# Patient Record
Sex: FEMALE | Race: WHITE | Employment: FULL TIME | URBAN - METROPOLITAN AREA
[De-identification: names, ages, dates, MRNs, and addresses within clinical notes are randomized per-mention and may not be internally consistent; named-entity substitution may affect disease eponyms.]

---

## 2017-02-13 ENCOUNTER — MYC MEDICAL ADVICE (OUTPATIENT)
Dept: SURGERY | Facility: CLINIC | Age: 52
End: 2017-02-13

## 2017-02-13 DIAGNOSIS — K91.2 POSTSURGICAL MALABSORPTION: ICD-10-CM

## 2017-02-13 DIAGNOSIS — D64.9 ANEMIA: Primary | ICD-10-CM

## 2017-02-13 DIAGNOSIS — Z98.84 BARIATRIC SURGERY STATUS: ICD-10-CM

## 2017-02-13 NOTE — TELEPHONE ENCOUNTER
Patient requesting labs be placed for upcoming appointment 2/17/17..  She was seen for her 3 month PO gastric bypass appointment 11/18/16.  Orders placed per standard protocol.  Keeley Xiong MS, RD, RN

## 2017-02-14 DIAGNOSIS — D64.9 ANEMIA: ICD-10-CM

## 2017-02-14 DIAGNOSIS — Z98.84 BARIATRIC SURGERY STATUS: ICD-10-CM

## 2017-02-14 DIAGNOSIS — K91.2 POSTSURGICAL MALABSORPTION: ICD-10-CM

## 2017-02-14 LAB
ERYTHROCYTE [DISTWIDTH] IN BLOOD BY AUTOMATED COUNT: 14.4 % (ref 10–15)
HCT VFR BLD AUTO: 38.9 % (ref 35–47)
HGB BLD-MCNC: 12.6 G/DL (ref 11.7–15.7)
MCH RBC QN AUTO: 30.2 PG (ref 26.5–33)
MCHC RBC AUTO-ENTMCNC: 32.4 G/DL (ref 31.5–36.5)
MCV RBC AUTO: 93 FL (ref 78–100)
PLATELET # BLD AUTO: 219 10E9/L (ref 150–450)
PTH-INTACT SERPL-MCNC: 48 PG/ML (ref 12–72)
RBC # BLD AUTO: 4.17 10E12/L (ref 3.8–5.2)
WBC # BLD AUTO: 6 10E9/L (ref 4–11)

## 2017-02-14 PROCEDURE — 36415 COLL VENOUS BLD VENIPUNCTURE: CPT | Performed by: PHYSICIAN ASSISTANT

## 2017-02-14 PROCEDURE — 85027 COMPLETE CBC AUTOMATED: CPT | Performed by: PHYSICIAN ASSISTANT

## 2017-02-14 PROCEDURE — 82728 ASSAY OF FERRITIN: CPT | Performed by: PHYSICIAN ASSISTANT

## 2017-02-14 PROCEDURE — 83970 ASSAY OF PARATHORMONE: CPT | Performed by: PHYSICIAN ASSISTANT

## 2017-02-14 PROCEDURE — 83550 IRON BINDING TEST: CPT | Performed by: PHYSICIAN ASSISTANT

## 2017-02-14 PROCEDURE — 82306 VITAMIN D 25 HYDROXY: CPT | Performed by: PHYSICIAN ASSISTANT

## 2017-02-14 PROCEDURE — 83540 ASSAY OF IRON: CPT | Performed by: PHYSICIAN ASSISTANT

## 2017-02-15 LAB
DEPRECATED CALCIDIOL+CALCIFEROL SERPL-MC: 48 UG/L (ref 20–75)
FERRITIN SERPL-MCNC: 10 NG/ML (ref 8–252)
IRON SATN MFR SERPL: 32 % (ref 15–46)
IRON SERPL-MCNC: 109 UG/DL (ref 35–180)
TIBC SERPL-MCNC: 340 UG/DL (ref 240–430)

## 2017-02-17 ENCOUNTER — OFFICE VISIT (OUTPATIENT)
Dept: SURGERY | Facility: CLINIC | Age: 52
End: 2017-02-17
Payer: COMMERCIAL

## 2017-02-17 VITALS
HEIGHT: 65 IN | HEART RATE: 62 BPM | DIASTOLIC BLOOD PRESSURE: 71 MMHG | RESPIRATION RATE: 14 BRPM | BODY MASS INDEX: 30.72 KG/M2 | WEIGHT: 184.4 LBS | SYSTOLIC BLOOD PRESSURE: 117 MMHG

## 2017-02-17 DIAGNOSIS — Z98.84 STATUS POST BARIATRIC SURGERY: Primary | ICD-10-CM

## 2017-02-17 DIAGNOSIS — Z98.84 BARIATRIC SURGERY STATUS: ICD-10-CM

## 2017-02-17 DIAGNOSIS — E61.1 IRON DEFICIENCY: ICD-10-CM

## 2017-02-17 DIAGNOSIS — K91.2 MALNUTRITION FOLLOWING GASTROINTESTINAL SURGERY: ICD-10-CM

## 2017-02-17 DIAGNOSIS — K91.2 POSTSURGICAL MALABSORPTION: ICD-10-CM

## 2017-02-17 PROCEDURE — 97803 MED NUTRITION INDIV SUBSEQ: CPT | Performed by: DIETITIAN, REGISTERED

## 2017-02-17 PROCEDURE — 99214 OFFICE O/P EST MOD 30 MIN: CPT | Performed by: PHYSICIAN ASSISTANT

## 2017-02-17 RX ORDER — ASPIRIN 81 MG
100 TABLET, DELAYED RELEASE (ENTERIC COATED) ORAL DAILY
Qty: 60 TABLET | Refills: 1 | COMMUNITY
Start: 2017-02-17

## 2017-02-17 NOTE — PATIENT INSTRUCTIONS
Have follow up labs drawn in 6 months before your annual visit.  Continue current vitamins.  Return to clinic in 6 months.

## 2017-02-17 NOTE — PROGRESS NOTES
POST-OPERATIVE NUTRITION APPOINTMENT  DATE OF VISIT: 2017  Name: MORAIMA CARRERO  : 1965  Gender: Female  MRN: [id number]  Age: 51    ASSESSMENT:  REASON FOR VISIT:  MORAIMA CARRERO is a 51 year old Female presents today for 6 month PO nutrition follow-up appointment. She was accompanied by her spouse, Mohamud.  DIAGNOSIS:  Status post Laparoscopic Isabel-en-Y Gastric Bypass surgery.  ANTHROPOMETRICS:  Height: 66 inches  Weight: 184.4 lbs  BMI: 29.8 kg/m2  VITAMINS AND MINERALS:  PACARLOS reviewed  2 multivitamins/ minerals ([employer])  500 mg calcium with vitamin D -TID  2000 International Units Vitamin D  1000 mcg vitamin B-12, SL  2 iron  NUTRITION HISTORY:  Breakfast: Protein drink (30 g protein) within 1 hour if at home or when traveling-scrambled egg, turkey sausage or ham, small amount of fruit   Lunch: ~3 oz chicken or pork tenderloin, sugar free canned fruit or leftover green beans   Supper: ~ 3-4 oz fish or chicken or steak, vegetable, small amount of mashed or baked potato  Snacks: pretzels or few tortilla chips-afternoon and after dinner   Beverages:Crystal light Protein drink Gatorade Zero  Consuming liquid calories: none  Protein intake: 60-90 grams/day  Tolerate regular texture food: Yes  Any foods not tolerated details: fresh citrus  Portion size: 1 cup  Take 30 minutes to consume each meal: Sometimes  Eat protein foods first: Yes  Fluids and meals separate by at least 30 minutes: Yes  Chew foods 20 plus times: Yes  Tolerating diet: bariatric regular diet  Drinking high protein supplements/shakes: Yes  Consuming meals per day: 3  Consuming snacks per day: 1  Comment: Biggest concern is hunger between meals; currently snacking on high carbohydrate foods (pretzels or chips) BID; patient is pleased with her weight loss; pt travel's for work ~ 3 months out of the year and finds these days more challenging to make good food choices    PHYSICAL ACTIVITY:  Type: Cardio and weights  Frequency: 4-5 times a  "week  Duration (min): 60  DIAGNOSIS:  Previous Nutrition Diagnosis: Altered gastrointestinal function related to alteration in gastrointestinal structure as evidenced by history of Laparoscopic Isabel-en-Y Gastric Bypass surgery.  Previous goals:  Eat 3 different food groups per meal-improving  Continue following post- surgical diet guidelines-met most days  Current Nutrition Diagnosis: Altered gastrointestinal function related to alteration in gastrointestinal structure as evidenced by history of Laparoscopic Isabel-en-Y Gastric Bypass   Unchanged  INTERVENTION:  Nutrition Prescription: Eat 3 meals a day at regular intervals. Consume 60-90 grams of protein daily. Follow post-surgical vitamins and minerals protocol.  Goals:  Focus on getting in 10-15 g fiber per day (pt wants to go back to tracking on ambika) to help with satiety  Consistently take 20-30 minutes per meal  Try having \"solid food\" at breakfast and use one protein drink between meals instead of snacking   Implementation: Discussed progress toward previous goals; reinforced importance of following bariatric lifestyle changes  NUTRITION MONITORING AND EVALUATION:  Anticipated compliance: Good  Verbalized understanding by listing bariatric behaviors that are most important to her    Follow up:  Patient to follow up in 1 year.  TIME SPENT WITH PATIENT:  30 minutes  Cordell Green RD, LD  [facility]  [phone]  "

## 2017-02-17 NOTE — MR AVS SNAPSHOT
After Visit Summary   2/17/2017    Nicole Tinajero    MRN: [id number]           Patient Information     Date Of Birth          1965        Visit Information        Provider Department      2/17/2017 10:00 AM Barbra Mclain PA-C [facility]      Today's Diagnoses     Status post bariatric surgery    -  1    Iron deficiency        Malnutrition following gastrointestinal surgery        BMI 29.0-29.9,adult        Bariatric surgery status        Postsurgical malabsorption          Care Instructions    Have follow up labs drawn in 6 months before your annual visit.  Continue current vitamins.  Return to clinic in 6 months.             Follow-ups after your visit        Follow-up notes from your care team     Return in 6 months (on 8/17/2017).      Your next 10 appointments already scheduled     Aug 18, 2017 10:00 AM CDT   Annual Visit with Barbra Mclain PA-C   [facility] [place] ([facility])    [address] [place] [id number]   [phone]            Aug 18, 2017 10:30 AM CDT   Annual Visit with Logan Ross 2, RD   [facility] [place] ([facility])    [address] [place], [place] [id number]  [facility] [address]   [phone]              Future tests that were ordered for you today     Open Future Orders        Priority Expected Expires Ordered    CBC with platelets Routine 8/17/2017 2/17/2018 2/17/2017    Vitamin B12 Routine 8/17/2017 2/17/2018 2/17/2017    Vitamin D Screen Routine 8/17/2017 2/17/2018 2/17/2017    Parathyroid Hormone Intact Routine 8/17/2017 2/17/2018 2/17/2017    Iron and Iron Binding Capacity Routine 8/17/2017 2/17/2018 2/17/2017    Ferritin Routine 8/17/2017 2/17/2018 2/17/2017            Who to contact     If you have questions or need follow up information  "about today's clinic visit or your schedule please contact [facility] directly at [phone].  Normal or non-critical lab and imaging results will be communicated to you by MyChart, letter or phone within 4 business days after the clinic has received the results. If you do not hear from us within 7 days, please contact the clinic through [facility] or phone. If you have a critical or abnormal lab result, we will notify you by phone as soon as possible.  Submit refill requests through [employer]acast or call your pharmacy and they will forward the refill request to us. Please allow 3 business days for your refill to be completed.          Additional Information About Your Visit        Abacast Information     [employer] gives you secure access to your electronic health record. If you see a primary care provider, you can also send messages to your care team and make appointments. If you have questions, please call your primary care clinic.  If you do not have a primary care provider, please call [phone] and they will assist you.        Care EveryWhere ID     This is your Care EveryWhere ID. This could be used by other organizations to access your [facility] medical records  [id number]        Your Vitals Were     Pulse Respirations Height BMI (Body Mass Index)          62 14 5' 5.25\" (1.657 m) 30.45 kg/m2         Blood Pressure from Last 3 Encounters:   02/17/17 117/71   11/18/16 122/81   09/13/16 112/76    Weight from Last 3 Encounters:   02/17/17 184 lb 6.4 oz (83.6 kg)   11/18/16 205 lb 14.4 oz (93.4 kg)   08/31/16 233 lb (105.7 kg)              We Performed the Following     OP ROOMING NOTE TO MAGI        Primary Care Provider Office Phone # Fax #    June Narvaez [phone]       [facility] [address] [facility] [address]        Thank you!     Thank you for choosing [facility] [place] [facility]  for your care. Our goal is " always to provide you with excellent care. Hearing back from our patients is one way we can continue to improve our services. Please take a few minutes to complete the written survey that you may receive in the mail after your visit with us. Thank you!             Your Updated Medication List - Protect others around you: Learn how to safely use, store and throw away your medicines at www.disposemymeds.org.          This list is accurate as of: 2/17/17 11:24 AM.  Always use your most recent med list.                   Brand Name Dispense Instructions for use    BIOTIN PO      Take 10,000 mcg by mouth daily       CALCIUM CITRATE + D PO      Take 500 mg by mouth 2 times daily       CYANOCOBALAMIN SL      Place 1,000 mcg under the tongue every morning       docusate sodium 100 MG tablet    COLACE    60 tablet    Take 100 mg by mouth daily       fluticasone 50 MCG/ACT spray    FLONASE    16 g    Spray 1-2 sprays into both nostrils daily       multivitamin  peds with iron 60 MG chewable tablet      Take 2 chew tab by mouth every morning       VITAMIN D (CHOLECALCIFEROL) PO      Take 2,000 Units by mouth daily       VITRON-C PO      Take 2 tablets by mouth every morning

## 2017-02-17 NOTE — MR AVS SNAPSHOT
MRN:[id number]                      After Visit Summary   2/17/2017    Nicole Tinajero    MRN: [id number]           Visit Information        Provider Department      2/17/2017 10:30 AM 1, Logan Ross, JEFFERY [facility]      Your next 10 appointments already scheduled     Aug 18, 2017 10:00 AM CDT   Annual Visit with Barbra Mclain PA-C   [facility] ([facility])    [address] [place] [id number] [phone]            Aug 18, 2017 10:30 AM CDT   Annual Visit with Logan Ross 2, RD   [facility] [place] ([facility])    [address] [place] [id number] [phone]              MyChart Information     [facility] gives you secure access to your electronic health record. If you see a primary care provider, you can also send messages to your care team and make appointments. If you have questions, please call your primary care clinic.  If you do not have a primary care provider, please call [phone] and they will assist you.        Care EveryWhere ID     This is your Care EveryWhere ID. This could be used by other organizations to access your [facility] medical records  [id number]

## 2017-02-17 NOTE — PROGRESS NOTES
Bariatric Follow Up Visit     Date: 2017    RE: MORAIMA CARRERO  MR#: [id number]  : 1965    HISTORY OF PRESENT ILLNESS: MORAIMA CARRERO returns today for her follow-up appointment status post Laparoscopic Isabel-en-Y Gastric Bypass surgery. She has lost 72.6 lbs since starting our program. Patient is feeling well. She is doing the following exercise(s): Cardio, weights, and fitness classes. She exercises 5 times a week for 60 minutes per session.    Patient is taking the following bariatric postoperative vitamins:  2 Complete multivitamins with minerals (at different times than calcium)  2000 Int Units of Vitamin D daily   1712-6230 mg of Calcium daily in divided doses  1000 mcg of Vitamin B12 sl daily  2 Iron/Vit C. daily    OBESITY RELATED CONDITIONS:  HTN: resolved, off medications  GERD: resolved  Joint Pain: improved    SOCIAL HISTORY:  She does not smoke. She does not drink alcohol. She attends support group and feels safe in current environment.    REVIEW OF SYSTEMS:      GI:  Nausea-never  Vomiting-never  Diarrhea-never  Constipation-never  Dysphagia-never  Abdominal Pains-never  Heartburn-never    Skin:  Intertriginous Irritation-never  Hair- hair loss has slowed.     Psychiatric:  Depression never    PHYSICAL EXAMINATION:   /71  Pulse 62  Resp 14  Wt 184 lb 6.4 oz (83.6 kg)  BMI 29.76 kg/m2  GENERAL: Alert and oriented x3. NAD  HEART: Regular rate and rhythm, No murmurs, rubs or gallops  LUNGS: Breathing unlabored, Lung sounds clear to auscultation bilaterally  ABDOMEN: soft; nontender; nondistended, BS present,Incisions well healed.     EXTREMITIES: No LE Edema  SKIN: no intertriginous irritation.    ASSESSMENT/PLAN:   6 months s/p Laparoscopic Isabel-en-Y Gastric Bypass  Morbid obesity   resolved BMI 29  Post surgical malabsorption   Reviewed CBC, vitamin B12, vitamin D, PTH, ferritin, TIBC, and iron labs.   Ordered CBC, vitamin B12, vitamin D, PTH, ferritin, TIBC, and iron labs to be drawn  in 6 months before annual  appt.    Follow food plan per dietitian recommendations.   Continue taking recommended post-op vitamins.  HTN- resolved  GERD resolved  Iron deficiency    continue 2 iron supplements daily along with 1 colace.  Telogen Effluvium- resolved    Follow up:   Return to clinic in 3 months.

## 2017-04-20 ENCOUNTER — OFFICE VISIT (OUTPATIENT)
Dept: INTERNAL MEDICINE | Facility: CLINIC | Age: 52
End: 2017-04-20
Payer: COMMERCIAL

## 2017-04-20 VITALS
HEIGHT: 65 IN | OXYGEN SATURATION: 99 % | HEART RATE: 85 BPM | DIASTOLIC BLOOD PRESSURE: 70 MMHG | WEIGHT: 181 LBS | SYSTOLIC BLOOD PRESSURE: 110 MMHG | TEMPERATURE: 98.4 F | BODY MASS INDEX: 30.16 KG/M2

## 2017-04-20 DIAGNOSIS — R32 URINARY INCONTINENCE, UNSPECIFIED TYPE: Primary | ICD-10-CM

## 2017-04-20 LAB
ALBUMIN UR-MCNC: NEGATIVE MG/DL
APPEARANCE UR: CLEAR
BILIRUB UR QL STRIP: NEGATIVE
COLOR UR AUTO: YELLOW
GLUCOSE UR STRIP-MCNC: NEGATIVE MG/DL
HGB UR QL STRIP: NEGATIVE
KETONES UR STRIP-MCNC: ABNORMAL MG/DL
LEUKOCYTE ESTERASE UR QL STRIP: NEGATIVE
MUCOUS THREADS #/AREA URNS LPF: PRESENT /LPF
NITRATE UR QL: NEGATIVE
NON-SQ EPI CELLS #/AREA URNS LPF: ABNORMAL /LPF
PH UR STRIP: 5.5 PH (ref 5–7)
RBC #/AREA URNS AUTO: ABNORMAL /HPF (ref 0–2)
SP GR UR STRIP: 1.02 (ref 1–1.03)
URN SPEC COLLECT METH UR: ABNORMAL
UROBILINOGEN UR STRIP-ACNC: 0.2 EU/DL (ref 0.2–1)
WBC #/AREA URNS AUTO: ABNORMAL /HPF (ref 0–2)

## 2017-04-20 PROCEDURE — 99213 OFFICE O/P EST LOW 20 MIN: CPT | Performed by: NURSE PRACTITIONER

## 2017-04-20 PROCEDURE — 81001 URINALYSIS AUTO W/SCOPE: CPT | Performed by: NURSE PRACTITIONER

## 2017-04-20 NOTE — LETTER
[facility]  [address], [address]  [place]  [address]      April 20, 2017    Nicole Tianjero                                                                                                                                                       [address]  [facility] [address]      Dear Nicole,    The results of your recent tests were within normal limits/stable.    Enclosed is a copy of the results.  It was a pleasure to see you at your last appointment.    If you have any questions or concerns, please contact our office at [phone].        Sincerely,      Beatris Villanueva C.N.P.

## 2017-04-20 NOTE — NURSING NOTE
"Chief Complaint   Patient presents with     Incontinence     Vaginal Itching       Initial /70  Pulse 85  Temp 98.4  F (36.9  C) (Oral)  Ht 5' 5.25\" (1.657 m)  Wt 181 lb (82.1 kg)  SpO2 99%  BMI 29.89 kg/m2 Estimated body mass index is 29.89 kg/(m^2) as calculated from the following:    Height as of this encounter: 5' 5.25\" (1.657 m).    Weight as of this encounter: 181 lb (82.1 kg).  Medication Reconciliation: complete    "

## 2017-04-20 NOTE — PROGRESS NOTES
SUBJECTIVE:                                                    Nicole Tinajero is a 51 year old female who presents to clinic today for the following health issues:        URINARY TRACT SYMPTOMS      Duration: months    Description  incontinence    Intensity:  Intermittent dribbling small amount urine    Accompanying signs and symptoms:  Fever/chills: no   Flank pain no   Nausea and vomiting: no   Vaginal symptoms: irritation of labia  Abdominal/Pelvic Pain: no     History  History of frequent UTI's: no   History of kidney stones: no   Sexually Active: YES  Possibility of pregnancy: No    Precipitating or alleviating factors: worse with exercise, can wake with damp underpants    Therapies tried and outcome: none           Patient Active Problem List   Diagnosis     CARDIOVASCULAR SCREENING; LDL GOAL LESS THAN 130     Female stress incontinence     Bariatric surgery status     BMI 29.0-29.9,adult     Malnutrition following gastrointestinal surgery     Past Surgical History:   Procedure Laterality Date     LAPAROSCOPIC BYPASS GASTRIC, CHOLECYSTECTOMY, COMBINED N/A 8/17/2016    Procedure: COMBINED LAPAROSCOPIC BYPASS GASTRIC, CHOLECYSTECTOMY;  Surgeon: Nemesio Clarke MD;  Location:  OR       Social History   Substance Use Topics     Smoking status: Never Smoker     Smokeless tobacco: Never Used     Alcohol use Yes      Comment: once per month     Family History   Problem Relation Age of Onset     CANCER Father      Chronic Obstructive Pulmonary Disease Mother          Current Outpatient Prescriptions   Medication Sig Dispense Refill     docusate sodium (COLACE) 100 MG tablet Take 100 mg by mouth daily 60 tablet 1     BIOTIN PO Take 10,000 mcg by mouth daily       Iron-Vitamin C (VITRON-C PO) Take 2 tablets by mouth every morning        Calcium Citrate-Vitamin D (CALCIUM CITRATE + D PO) Take 500 mg by mouth 2 times daily        fluticasone (FLONASE) 50 MCG/ACT nasal spray Spray 1-2 sprays into both  "nostrils daily 16 g 0     CYANOCOBALAMIN SL Place 1,000 mcg under the tongue every morning        VITAMIN D, CHOLECALCIFEROL, PO Take 2,000 Units by mouth daily       multivitamin  peds with iron (FLINTSTONES COMPLETE) 60 MG chewable tablet Take 2 chew tab by mouth every morning        BP Readings from Last 3 Encounters:   04/20/17 110/70   02/17/17 117/71   11/18/16 122/81    Wt Readings from Last 3 Encounters:   04/20/17 181 lb (82.1 kg)   02/17/17 184 lb 6.4 oz (83.6 kg)   11/18/16 205 lb 14.4 oz (93.4 kg)                    ROS:  Constitutional, HEENT, cardiovascular, pulmonary, gi and gu systems are negative, except as otherwise noted.    OBJECTIVE:                                                    /70  Pulse 85  Temp 98.4  F (36.9  C) (Oral)  Ht 5' 5.25\" (1.657 m)  Wt 181 lb (82.1 kg)  SpO2 99%  BMI 29.89 kg/m2  Body mass index is 29.89 kg/(m^2).  GENERAL: healthy, alert and no distress         ASSESSMENT/PLAN:                                                              ICD-10-CM    1. Urinary incontinence, unspecified type R32 UA with Microscopic reflex to Culture     UROLOGY ADULT REFERRAL       Patient Instructions   Beatris Villanueva, NP  [facility]      "

## 2017-04-20 NOTE — MR AVS SNAPSHOT
After Visit Summary   4/20/2017    Nicole Tinajero    MRN: [id number]           Patient Information     Date Of Birth          1965        Visit Information        Provider Department      4/20/2017 10:40 AM Beatris Villanueva NP [facility]        Today's Diagnoses     Urinary incontinence, unspecified type    -  1      Care Instructions    Beatris Villanueva CNP          Follow-ups after your visit        Additional Services     UROLOGY ADULT REFERRAL       Your provider has referred you to: FMG: [facility] for Bladder Control [facility] [phone]   [email or web address]    Please be aware that coverage of these services is subject to the terms and limitations of your health insurance plan.  Call member services at your health plan with any benefit or coverage questions.      Please bring the following with you to your appointment:    (1) Any X-Rays, CTs or MRIs which have been performed.  Contact the facility where they were done to arrange for  prior to your scheduled appointment.    (2) List of current medications  (3) This referral request   (4) Any documents/labs given to you for this referral                  Your next 10 appointments already scheduled     Aug 18, 2017 10:00 AM CDT   Annual Visit with Barbra Mclain PA-C   [facility])    [address] [place] [id number] [phone]            Aug 18, 2017 10:30 AM CDT   Annual Visit with Logan Ross 2, RD   [facility])    [address] [place] [id number] [phone]              Who to contact     If you have questions or need follow up information about today's clinic visit or your schedule please contact [facility] directly at [phone].  Normal or  "non-critical lab and imaging results will be communicated to you by MyChart, letter or phone within 4 business days after the clinic has received the results. If you do not hear from us within 7 days, please contact the clinic through Slackt or phone. If you have a critical or abnormal lab result, we will notify you by phone as soon as possible.  Submit refill requests through xzoops or call your pharmacy and they will forward the refill request to us. Please allow 3 business days for your refill to be completed.          Additional Information About Your Visit        xzoops Information     x[employer]ops gives you secure access to your electronic health record. If you see a primary care provider, you can also send messages to your care team and make appointments. If you have questions, please call your primary care clinic.  If you do not have a primary care provider, please call [phone] and they will assist you.        Care EveryWhere ID     This is your Care EveryWhere ID. This could be used by other organizations to access your [facility] medical records  [id number]        Your Vitals Were     Pulse Temperature Height Pulse Oximetry BMI (Body Mass Index)       85 98.4  F (36.9  C) (Oral) 5' 5.25\" (1.657 m) 99% 29.89 kg/m2        Blood Pressure from Last 3 Encounters:   04/20/17 110/70   02/17/17 117/71   11/18/16 122/81    Weight from Last 3 Encounters:   04/20/17 181 lb (82.1 kg)   02/17/17 184 lb 6.4 oz (83.6 kg)   11/18/16 205 lb 14.4 oz (93.4 kg)              We Performed the Following     UA with Microscopic reflex to Culture     UROLOGY ADULT REFERRAL        Primary Care Provider Office Phone # Fax #    June Narvaez [phone]       [facility] [address] [facility] [address]        Thank you!     Thank you for choosing [facility]  for your care. Our goal is always to provide you with excellent care. Hearing back from our patients is one way we " can continue to improve our services. Please take a few minutes to complete the written survey that you may receive in the mail after your visit with us. Thank you!             Your Updated Medication List - Protect others around you: Learn how to safely use, store and throw away your medicines at www.disposemymeds.org.          This list is accurate as of: 4/20/17 11:15 AM.  Always use your most recent med list.                   Brand Name Dispense Instructions for use    BIOTIN PO      Take 10,000 mcg by mouth daily       CALCIUM CITRATE + D PO      Take 500 mg by mouth 2 times daily       CYANOCOBALAMIN SL      Place 1,000 mcg under the tongue every morning       docusate sodium 100 MG tablet    COLACE    60 tablet    Take 100 mg by mouth daily       fluticasone 50 MCG/ACT spray    FLONASE    16 g    Spray 1-2 sprays into both nostrils daily       multivitamin  peds with iron 60 MG chewable tablet      Take 2 chew tab by mouth every morning       VITAMIN D (CHOLECALCIFEROL) PO      Take 2,000 Units by mouth daily       VITRON-C PO      Take 2 tablets by mouth every morning

## 2017-06-08 ENCOUNTER — OFFICE VISIT (OUTPATIENT)
Dept: UROLOGY | Facility: CLINIC | Age: 52
End: 2017-06-08
Payer: COMMERCIAL

## 2017-06-08 DIAGNOSIS — N39.3 FEMALE STRESS INCONTINENCE: Primary | ICD-10-CM

## 2017-06-08 LAB
ALBUMIN UR-MCNC: NEGATIVE MG/DL
APPEARANCE UR: CLEAR
BILIRUB UR QL STRIP: NEGATIVE
COLOR UR AUTO: YELLOW
GLUCOSE UR STRIP-MCNC: NEGATIVE MG/DL
HGB UR QL STRIP: NEGATIVE
KETONES UR STRIP-MCNC: ABNORMAL MG/DL
LEUKOCYTE ESTERASE UR QL STRIP: ABNORMAL
NITRATE UR QL: NEGATIVE
PH UR STRIP: 5 PH (ref 5–7)
SP GR UR STRIP: 1.02 (ref 1–1.03)
URN SPEC COLLECT METH UR: ABNORMAL
UROBILINOGEN UR STRIP-ACNC: 0.2 EU/DL (ref 0.2–1)

## 2017-06-08 PROCEDURE — 99244 OFF/OP CNSLTJ NEW/EST MOD 40: CPT | Mod: 25 | Performed by: UROLOGY

## 2017-06-08 PROCEDURE — 52000 CYSTOURETHROSCOPY: CPT | Performed by: UROLOGY

## 2017-06-08 PROCEDURE — 81003 URINALYSIS AUTO W/O SCOPE: CPT | Mod: QW | Performed by: UROLOGY

## 2017-06-08 NOTE — PROGRESS NOTES
Nicole Tinajero is a 52 year old female seen in consultation for incont. Consult from June Narvaez.      Pt reports several yr hx of progressive CARLOS, now very bothersome, limits exercise, wears several light pads (more to exercise). Also some insensate loss, marlen at nite; rarely wears pad at nite.    Denies dysuria, gross hematuria, frequency, prior  eval, signif UTI's, prior bladder surgery, use of bladder meds, success with Kegel's.     Hx 2 vag deliveries,  vas. Denies constipation, fecal incont. Moderate fluid and caffeine.    Works in [employer]; job located in [facility].      Past Medical History:   Diagnosis Date     Hypertension        Past Surgical History:   Procedure Laterality Date     LAPAROSCOPIC BYPASS GASTRIC, CHOLECYSTECTOMY, COMBINED N/A 8/17/2016    Procedure: COMBINED LAPAROSCOPIC BYPASS GASTRIC, CHOLECYSTECTOMY;  Surgeon: Nemesio Clarke MD;  Location:  OR       Social History     Social History     Marital status:      Spouse name: N/A     Number of children: N/A     Years of education: N/A     Occupational History     Not on file.     Social History Main Topics     Smoking status: Never Smoker     Smokeless tobacco: Never Used     Alcohol use Yes      Comment: once per month     Drug use: No     Sexual activity: Yes     Partners: Male      Comment: ; 2 children (26 and 19)     Other Topics Concern     Not on file     Social History Narrative       Current Outpatient Prescriptions   Medication Sig Dispense Refill     docusate sodium (COLACE) 100 MG tablet Take 100 mg by mouth daily 60 tablet 1     BIOTIN PO Take 10,000 mcg by mouth daily       Iron-Vitamin C (VITRON-C PO) Take 2 tablets by mouth every morning        Calcium Citrate-Vitamin D (CALCIUM CITRATE + D PO) Take 500 mg by mouth 2 times daily        CYANOCOBALAMIN SL Place 1,000 mcg under the tongue every morning        VITAMIN D, CHOLECALCIFEROL, PO Take 2,000 Units by mouth daily        multivitamin  peds with iron (FLINTSTONES COMPLETE) 60 MG chewable tablet Take 2 chew tab by mouth every morning        fluticasone (FLONASE) 50 MCG/ACT nasal spray Spray 1-2 sprays into both nostrils daily (Patient not taking: Reported on 6/8/2017) 16 g 0       Physical Exam:    GENL: NAD.    ABD: Soft, non-tender, no masses.    EG: Well-estrogenized, no masses.    VAGINA: Well-estrogenized, no masses.    BN HYPERMOBILITY: Moderate.    CYSTOCELE: Grade 1.    APICAL PROLAPSE: Minimal.    RECTOCELE: Minimal.    BIMANUAL: No mass or tenderness.    Cysto:    (Informed consent obtained. Pause for cause performed)   Sterile prep.    17 Fr scope inserted through urethra. Systematic examination w 70 degree lens.   PVR: 10 cc   MUCOSA: Normal without lesion   ORIFICES: Normal location and morphology   CAPACITY: 400 cc; no pain with filling   Scope withdrawn without untoward effect.    Valsalva:   Mild hypermobility, moderate leakage noted.    (Pt tolerated procedure without difficulty).      Results for orders placed or performed in visit on 06/08/17   UA without Microscopic   Result Value Ref Range    Color Urine Yellow     Appearance Urine Clear     Glucose Urine Negative NEG mg/dL    Bilirubin Urine Negative NEG    Ketones Urine Trace (A) NEG mg/dL    Specific Gravity Urine 1.020 1.003 - 1.035    Blood Urine Negative NEG    pH Urine 5.0 5.0 - 7.0 pH    Protein Albumin Urine Negative NEG mg/dL    Urobilinogen Urine 0.2 0.2 - 1.0 EU/dL    Nitrite Urine Negative NEG    Leukocyte Esterase Urine Trace (A) NEG    Source Midstream Urine          IMP:  1. CARLOS with hypermobility      PLAN:  1. Discussed situation with patient in detail.    2. ALTIS SLING DISCUSSION: We discussed the patients situation in detail including her specific anatomy and conditions. Diagrams are drawn.    We discussed the surgical treatment including Altis pubovaginal sling utilizing mesh material. We addressed the technical aspects of the procedure as  well as the potential risks and complications incuding, but not limited to bleeding, infection, damage to organs or other injury. We discussed the recovery process and the potential effect on sexual function in detail. She also understands the alternative forms of therapy (including no therapy and treatment without mesh), and the potential need for additional therapy. We discussed the FDA report on the use of surgical mesh. All questions are answered in detail. Informed consent is obtained. Consent form signed. Handout given.    Pt is eager to proceed asap.    3. 60 minutes spent with patient, more than 50% in counseling and coordination of care for incont, which did not include time spent for the procedure.    4. Copy Solange

## 2017-06-08 NOTE — MR AVS SNAPSHOT
After Visit Summary   6/8/2017    Nicole Tinajero    MRN: [id number]           Patient Information     Date Of Birth          1965        Visit Information        Provider Department      6/8/2017 2:00 PM Raad Rhoades MD [facility]        Today's Diagnoses     Female stress incontinence    -  1       Follow-ups after your visit        Your next 10 appointments already scheduled     Aug 18, 2017 10:00 AM CDT   Annual Visit with Barbra Mclain PA-C   [facility] ([facility])    [address] [place] [id number] [phone]            Aug 18, 2017 10:30 AM CDT   Annual Visit with Logan Ross 2, RD   [facility] ([facility])    [address] [place] [id number] [phone]              Who to contact     If you have questions or need follow up information about today's clinic visit or your schedule please contact [facility] [place] directly at [phone].  Normal or non-critical lab and imaging results will be communicated to you by [employer], letter or phone within 4 business days after the clinic has received the results. If you do not hear from us within 7 days, please contact the clinic through [employer] or phone. If you have a critical or abnormal lab result, we will notify you by phone as soon as possible.  Submit refill requests through [employer] or call your pharmacy and they will forward the refill request to us. Please allow 3 business days for your refill to be completed.          Additional Information About Your Visit        MyChart Information     [employer] gives you secure access to your electronic health record. If you see a primary care provider, you can also send messages to your care team and make appointments. If you have questions,  please call your primary care clinic.  If you do not have a primary care provider, please call [phone] and they will assist you.        Care EveryWhere ID     This is your Care EveryWhere ID. This could be used by other organizations to access your [facility] medical records  [id number]        Your Vitals Were     Last Period                   06/03/2017            Blood Pressure from Last 3 Encounters:   04/20/17 110/70   02/17/17 117/71   11/18/16 122/81    Weight from Last 3 Encounters:   04/20/17 181 lb (82.1 kg)   02/17/17 184 lb 6.4 oz (83.6 kg)   11/18/16 205 lb 14.4 oz (93.4 kg)              We Performed the Following     CYSTOURETHROSCOPY     UA without Microscopic        Primary Care Provider Office Phone # Fax #    June Narvaez [phone]       [facility] [address] [place] [address]        Thank you!     Thank you for choosing [facility]  for your care. Our goal is always to provide you with excellent care. Hearing back from our patients is one way we can continue to improve our services. Please take a few minutes to complete the written survey that you may receive in the mail after your visit with us. Thank you!             Your Updated Medication List - Protect others around you: Learn how to safely use, store and throw away your medicines at www.disposemymeds.org.          This list is accurate as of: 6/8/17  3:07 PM.  Always use your most recent med list.                   Brand Name Dispense Instructions for use    BIOTIN PO      Take 10,000 mcg by mouth daily       CALCIUM CITRATE + D PO      Take 500 mg by mouth 2 times daily       CYANOCOBALAMIN SL      Place 1,000 mcg under the tongue every morning       docusate sodium 100 MG tablet    COLACE    60 tablet    Take 100 mg by mouth daily       fluticasone 50 MCG/ACT spray    FLONASE    16 g    Spray 1-2 sprays into both nostrils daily       multivitamin   peds with iron 60 MG chewable tablet      Take 2 chew tab by mouth every morning       VITAMIN D (CHOLECALCIFEROL) PO      Take 2,000 Units by mouth daily       VITRON-C PO      Take 2 tablets by mouth every morning

## 2017-06-13 ENCOUNTER — OFFICE VISIT (OUTPATIENT)
Dept: INTERNAL MEDICINE | Facility: CLINIC | Age: 52
End: 2017-06-13
Payer: COMMERCIAL

## 2017-06-13 VITALS
OXYGEN SATURATION: 97 % | HEART RATE: 55 BPM | HEIGHT: 65 IN | BODY MASS INDEX: 28.66 KG/M2 | TEMPERATURE: 97 F | DIASTOLIC BLOOD PRESSURE: 72 MMHG | SYSTOLIC BLOOD PRESSURE: 106 MMHG | WEIGHT: 172 LBS | RESPIRATION RATE: 12 BRPM

## 2017-06-13 DIAGNOSIS — N39.3 FEMALE STRESS INCONTINENCE: ICD-10-CM

## 2017-06-13 DIAGNOSIS — Z98.84 BARIATRIC SURGERY STATUS: ICD-10-CM

## 2017-06-13 DIAGNOSIS — Z01.818 PREOPERATIVE EXAMINATION: Primary | ICD-10-CM

## 2017-06-13 PROCEDURE — 99203 OFFICE O/P NEW LOW 30 MIN: CPT | Performed by: NURSE PRACTITIONER

## 2017-06-13 PROCEDURE — 93000 ELECTROCARDIOGRAM COMPLETE: CPT | Performed by: NURSE PRACTITIONER

## 2017-06-13 NOTE — PATIENT INSTRUCTIONS
No aspirin ibuprofen or aleve products prior to surgery   May use tylenol products       Hold oral medication day of surgery

## 2017-06-13 NOTE — NURSING NOTE
"Chief Complaint   Patient presents with     Pre-Op Exam       Initial /72 (BP Location: Left arm, Patient Position: Chair, Cuff Size: Adult Large)  Pulse 55  Temp 97  F (36.1  C) (Oral)  Resp 12  Ht 5' 5.25\" (1.657 m)  Wt 172 lb (78 kg)  LMP 06/03/2017  SpO2 97%  BMI 28.4 kg/m2 Estimated body mass index is 28.4 kg/(m^2) as calculated from the following:    Height as of this encounter: 5' 5.25\" (1.657 m).    Weight as of this encounter: 172 lb (78 kg).  Medication Reconciliation: complete     NHUNG Tran      "

## 2017-06-13 NOTE — MR AVS SNAPSHOT
After Visit Summary   6/13/2017    Nicole Tinajero    MRN: [id number]           Patient Information     Date Of Birth          1965        Visit Information        Provider Department      6/13/2017 2:40 PM Fatuma Steinberg APRN CNP [facility]        Today's Diagnoses     Preoperative examination    -  1    Female stress incontinence        Bariatric surgery status          Care Instructions    No aspirin ibuprofen or aleve products prior to surgery   May use tylenol products       Hold oral medication day of surgery           Follow-ups after your visit        Your next 10 appointments already scheduled     Jun 19, 2017   Procedure with Raad Rhoades MD   [facility] (--)    [address]  [facility] [address]   [phone]            Jun 26, 2017  3:40 PM CDT   Office Visit with June Narvaez MD   [facility] ([facility])    [address]  [facility] [address]   [phone]           Bring a current list of meds and any records pertaining to this visit.  For Physicals, please bring immunization records and any forms needing to be filled out.  Please arrive 10 minutes early to complete paperwork.            Jul 13, 2017 11:15 AM CDT   Return Visit with Raad Rhoades MD   [facility] ([address] [address]   [phone]            Aug 18, 2017 10:00 AM CDT   Annual Visit with Barbra Mclain PA-C   [facility])    [address] [place] [phone]   [phone]            Aug 18, 2017 10:30 AM CDT   Annual Visit with Logan Ross 2, RD   [facility])    [address] [place] [id number]   [phone]              Who to  "contact     If you have questions or need follow up information about today's clinic visit or your schedule please contact [facility] directly at [phone].  Normal or non-critical lab and imaging results will be communicated to you by MyChart, letter or phone within 4 business days after the clinic has received the results. If you do not hear from us within 7 days, please contact the clinic through MyChart or phone. If you have a critical or abnormal lab result, we will notify you by phone as soon as possible.  Submit refill requests through [employer] or call your pharmacy and they will forward the refill request to us. Please allow 3 business days for your refill to be completed.          Additional Information About Your Visit        [employer] Information     [employer] gives you secure access to your electronic health record. If you see a primary care provider, you can also send messages to your care team and make appointments. If you have questions, please call your primary care clinic.  If you do not have a primary care provider, please call [phone] and they will assist you.        Care EveryWhere ID     This is your Care EveryWhere ID. This could be used by other organizations to access your [place] medical records  [id number]        Your Vitals Were     Pulse Temperature Respirations Height Last Period Pulse Oximetry    55 97  F (36.1  C) (Oral) 12 5' 5.25\" (1.657 m) 06/03/2017 97%    BMI (Body Mass Index)                   28.4 kg/m2            Blood Pressure from Last 3 Encounters:   06/13/17 106/72   04/20/17 110/70   02/17/17 117/71    Weight from Last 3 Encounters:   06/13/17 172 lb (78 kg)   04/20/17 181 lb (82.1 kg)   02/17/17 184 lb 6.4 oz (83.6 kg)              We Performed the Following     EKG 12-lead complete w/read - Clinics        Primary Care Provider Office Phone # Fax #    June Narvaez [phone]       [facility] [address] [facility] [address]        Thank you!     Thank you for choosing [facility]  for your care. Our goal is always to provide you with excellent care. Hearing back from our patients is one way we can continue to improve our services. Please take a few minutes to complete the written survey that you may receive in the mail after your visit with us. Thank you!             Your Updated Medication List - Protect others around you: Learn how to safely use, store and throw away your medicines at www.disposemymeds.org.          This list is accurate as of: 6/13/17  3:08 PM.  Always use your most recent med list.                   Brand Name Dispense Instructions for use    BIOTIN PO      Take 10,000 mcg by mouth daily       CALCIUM CITRATE + D PO      Take 500 mg by mouth 2 times daily       CYANOCOBALAMIN SL      Place 1,000 mcg under the tongue every morning       docusate sodium 100 MG tablet    COLACE    60 tablet    Take 100 mg by mouth daily       fluticasone 50 MCG/ACT spray    FLONASE    16 g    Spray 1-2 sprays into both nostrils daily       multivitamin  peds with iron 60 MG chewable tablet      Take 2 chew tab by mouth every morning       VITAMIN D (CHOLECALCIFEROL) PO      Take 2,000 Units by mouth daily       VITRON-C PO      Take 2 tablets by mouth every morning

## 2017-06-13 NOTE — PROGRESS NOTES
[place] [address]  [facility] [address]  [phone]  Dept: [phone]    PRE-OP EVALUATION:  Today's date: 2017    Nicole Tinajero (: 1965) presents for pre-operative evaluation assessment as requested by Dr. Rhoades.  She requires evaluation and anesthesia risk assessment prior to undergoing surgery/procedure for treatment of  Need to help with incontinence  .  Proposed procedure: pubovaginal sling (altis)    Date of Surgery/ Procedure: 17  Time of Surgery/ Procedure: 9:20 AM  Hospital/Surgical [facility]    Primary Physician: June Narvaez  Type of Anesthesia Anticipated: General    Patient has a Health Care Directive or Living Will:  NO    Preop Questions 2017   1.  Do you have a history of heart attack, stroke, stent, bypass or surgery on an artery in the head, neck, heart or legs? No   2.  Do you ever have any pain or discomfort in your chest? No   3.  Do you have a history of  Heart Failure? No   4.   Are you troubled by shortness of breath when:  walking on a level surface, or up a slight hill, or at night? No   5.  Do you currently have a cold, bronchitis or other respiratory infection? No   6.  Do you have a cough, shortness of breath, or wheezing? No   7.  Do you sometimes get pains in the calves of your legs when you walk? No   8. Do you or anyone in your family have previous history of blood clots? No   9.  Do you or does anyone in your family have a serious bleeding problem such as prolonged bleeding following surgeries or cuts? No   10. Have you ever had problems with anemia or been told to take iron pills? YES- takes iron pills post gastric bypass    11. Have you had any abnormal blood loss such as black, tarry or bloody stools, or abnormal vaginal bleeding? No   12. Have you ever had a blood transfusion? No   13. Have you or any of your relatives ever had problems with anesthesia? No   14. Do you have sleep apnea, excessive  snoring or daytime drowsiness? No   15. Do you have any prosthetic heart valves? No   16. Do you have prosthetic joints? No   17. Is there any chance that you may be pregnant? No         HPI:                                                      Brief HPI related to upcoming procedure: needs repair of bladder due to incontinence    Gastric bypass 8/17/2016    See problem list for active medical problems.  Problems all longstanding and stable, except as noted/documented.  See ROS for pertinent symptoms related to these conditions.                                                                                                  .    MEDICAL HISTORY:                                                      Patient Active Problem List    Diagnosis Date Noted     BMI 29.0-29.9,adult 02/17/2017     Priority: Medium     Malnutrition following gastrointestinal surgery 02/17/2017     Priority: Medium     Bariatric surgery status 08/23/2016     Priority: Medium     Female stress incontinence 04/05/2016     Priority: Medium     CARDIOVASCULAR SCREENING; LDL GOAL LESS THAN 130 12/06/2013     Priority: Medium      Past Medical History:   Diagnosis Date     Hypertension      Past Surgical History:   Procedure Laterality Date     LAPAROSCOPIC BYPASS GASTRIC, CHOLECYSTECTOMY, COMBINED N/A 8/17/2016    Procedure: COMBINED LAPAROSCOPIC BYPASS GASTRIC, CHOLECYSTECTOMY;  Surgeon: Nemesio Clarke MD;  Location:  OR     Current Outpatient Prescriptions   Medication Sig Dispense Refill     docusate sodium (COLACE) 100 MG tablet Take 100 mg by mouth daily 60 tablet 1     BIOTIN PO Take 10,000 mcg by mouth daily       Iron-Vitamin C (VITRON-C PO) Take 2 tablets by mouth every morning        Calcium Citrate-Vitamin D (CALCIUM CITRATE + D PO) Take 500 mg by mouth 2 times daily        fluticasone (FLONASE) 50 MCG/ACT nasal spray Spray 1-2 sprays into both nostrils daily 16 g 0     CYANOCOBALAMIN SL Place 1,000 mcg under the tongue every  "morning        VITAMIN D, CHOLECALCIFEROL, PO Take 2,000 Units by mouth daily       multivitamin  peds with iron (FLINTSTONES COMPLETE) 60 MG chewable tablet Take 2 chew tab by mouth every morning        OTC products: None, except as noted above    Allergies   Allergen Reactions     Sulfa Drugs Hives     Rash/hives        Latex Allergy: NO    Social History   Substance Use Topics     Smoking status: Never Smoker     Smokeless tobacco: Never Used     Alcohol use Yes      Comment: once per month     History   Drug Use No       REVIEW OF SYSTEMS:                                                    C: NEGATIVE for fever, chills, change in weight  I: NEGATIVE for worrisome rashes, moles or lesions  E: NEGATIVE for vision changes or irritation  E/M: NEGATIVE for ear, mouth and throat problems  R: NEGATIVE for significant cough or SOB  CV: NEGATIVE for chest pain, palpitations or peripheral edema  GI: NEGATIVE for nausea, abdominal pain, heartburn, or change in bowel habits  : stress incontinence   M: NEGATIVE for significant arthralgias or myalgia  N: NEGATIVE for weakness, dizziness or paresthesias  E: NEGATIVE for temperature intolerance, skin/hair changes  H: NEGATIVE for bleeding problems  P: NEGATIVE for changes in mood or affect    EXAM:                                                    /72 (BP Location: Left arm, Patient Position: Chair, Cuff Size: Adult Large)  Pulse 55  Temp 97  F (36.1  C) (Oral)  Resp 12  Ht 5' 5.25\" (1.657 m)  Wt 172 lb (78 kg)  LMP 06/03/2017  SpO2 97%  BMI 28.4 kg/m2    GENERAL APPEARANCE: alert and no distress     HENT: ear canals and TM's normal and nose and mouth without ulcers or lesions     RESP: lungs clear to auscultation - no rales, rhonchi or wheezes     CV: regular rates and rhythm, normal S1 S2, no S3 or S4 and no murmur, click or rub     ABDOMEN:  soft, nontender, no HSM or masses and bowel sounds normal     MS: extremities normal- no gross deformities noted, no " evidence of inflammation in joints, FROM in all extremities.     SKIN: no suspicious lesions or rashes     NEURO: Normal strength and tone, sensory exam grossly normal, mentation intact and speech normal     PSYCH: mentation appears normal. and affect normal/bright    DIAGNOSTICS:                                                    EKG: sinus bradycardia, normal axis, normal intervals, no acute ST/T changes c/w ischemia, no LVH by voltage criteria, unchanged from previous tracings    Recent Labs   Lab Test  02/14/17   1015  08/18/16   0717  08/17/16   1530  08/17/16   0851  08/12/16   0936  04/08/16   1435   HGB  12.6  11.3*   --    --   13.0  12.9   PLT  219   --   236   --    --   287   INR   --    --    --    --    --   0.96   NA   --   139   --    --   138  138   POTASSIUM   --   4.2   --   4.0  4.3  3.8   CR   --    --   0.83   --   0.75  0.68   A1C   --    --    --    --    --   5.4        IMPRESSION:                                                    Reason for surgery/procedure: incontinence- needs help with bladder sling   Diagnosis/reason for consult: pre op     The proposed surgical procedure is considered INTERMEDIATE risk.    REVISED CARDIAC RISK INDEX  The patient has the following serious cardiovascular risks for perioperative complications such as (MI, PE, VFib and 3  AV Block):  No serious cardiac risks  INTERPRETATION: 1 risks: Class II (low risk - 0.9% complication rate)    The patient has the following additional risks for perioperative complications:  No identified additional risks      ICD-10-CM    1. Preoperative examination Z01.818 EKG 12-lead complete w/read - Clinics   2. Female stress incontinence N39.3    3. Bariatric surgery status Z98.84        RECOMMENDATIONS:                                                          --Patient is to take no medication day of surgery     APPROVAL GIVEN to proceed with proposed procedure, without further diagnostic evaluation       Signed Electronically  by: SHAWN Love CNP    Copy of this evaluation report is provided to requesting physician.    [place] Preop Guidelines

## 2017-06-14 RX ORDER — FLUTICASONE PROPIONATE 50 MCG
1-2 SPRAY, SUSPENSION (ML) NASAL DAILY PRN
COMMUNITY
End: 2018-03-20

## 2017-06-16 NOTE — H&P (VIEW-ONLY)
[place] [address]  [facility] [address]  [phone]  Dept: [phone]    PRE-OP EVALUATION:  Today's date: 2017    Nicole Tinajero (: 1965) presents for pre-operative evaluation assessment as requested by Dr. Rhoades.  She requires evaluation and anesthesia risk assessment prior to undergoing surgery/procedure for treatment of  Need to help with incontinence  .  Proposed procedure: pubovaginal sling (altis)    Date of Surgery/ Procedure: 17  Time of Surgery/ Procedure: 9:20 AM  Hospital/Surgical Facility: [facility]    Primary Physician: June Narvaez  Type of Anesthesia Anticipated: General    Patient has a Health Care Directive or Living Will:  NO    Preop Questions 2017   1.  Do you have a history of heart attack, stroke, stent, bypass or surgery on an artery in the head, neck, heart or legs? No   2.  Do you ever have any pain or discomfort in your chest? No   3.  Do you have a history of  Heart Failure? No   4.   Are you troubled by shortness of breath when:  walking on a level surface, or up a slight hill, or at night? No   5.  Do you currently have a cold, bronchitis or other respiratory infection? No   6.  Do you have a cough, shortness of breath, or wheezing? No   7.  Do you sometimes get pains in the calves of your legs when you walk? No   8. Do you or anyone in your family have previous history of blood clots? No   9.  Do you or does anyone in your family have a serious bleeding problem such as prolonged bleeding following surgeries or cuts? No   10. Have you ever had problems with anemia or been told to take iron pills? YES- takes iron pills post gastric bypass    11. Have you had any abnormal blood loss such as black, tarry or bloody stools, or abnormal vaginal bleeding? No   12. Have you ever had a blood transfusion? No   13. Have you or any of your relatives ever had problems with anesthesia? No   14. Do you have sleep apnea, excessive  snoring or daytime drowsiness? No   15. Do you have any prosthetic heart valves? No   16. Do you have prosthetic joints? No   17. Is there any chance that you may be pregnant? No         HPI:                                                      Brief HPI related to upcoming procedure: needs repair of bladder due to incontinence    Gastric bypass 8/17/2016    See problem list for active medical problems.  Problems all longstanding and stable, except as noted/documented.  See ROS for pertinent symptoms related to these conditions.                                                                                                  .    MEDICAL HISTORY:                                                      Patient Active Problem List    Diagnosis Date Noted     BMI 29.0-29.9,adult 02/17/2017     Priority: Medium     Malnutrition following gastrointestinal surgery 02/17/2017     Priority: Medium     Bariatric surgery status 08/23/2016     Priority: Medium     Female stress incontinence 04/05/2016     Priority: Medium     CARDIOVASCULAR SCREENING; LDL GOAL LESS THAN 130 12/06/2013     Priority: Medium      Past Medical History:   Diagnosis Date     Hypertension      Past Surgical History:   Procedure Laterality Date     LAPAROSCOPIC BYPASS GASTRIC, CHOLECYSTECTOMY, COMBINED N/A 8/17/2016    Procedure: COMBINED LAPAROSCOPIC BYPASS GASTRIC, CHOLECYSTECTOMY;  Surgeon: Nemesio Clarke MD;  Location:  OR     Current Outpatient Prescriptions   Medication Sig Dispense Refill     docusate sodium (COLACE) 100 MG tablet Take 100 mg by mouth daily 60 tablet 1     BIOTIN PO Take 10,000 mcg by mouth daily       Iron-Vitamin C (VITRON-C PO) Take 2 tablets by mouth every morning        Calcium Citrate-Vitamin D (CALCIUM CITRATE + D PO) Take 500 mg by mouth 2 times daily        fluticasone (FLONASE) 50 MCG/ACT nasal spray Spray 1-2 sprays into both nostrils daily 16 g 0     CYANOCOBALAMIN SL Place 1,000 mcg under the tongue every  "morning        VITAMIN D, CHOLECALCIFEROL, PO Take 2,000 Units by mouth daily       multivitamin  peds with iron (FLINTSTONES COMPLETE) 60 MG chewable tablet Take 2 chew tab by mouth every morning        OTC products: None, except as noted above    Allergies   Allergen Reactions     Sulfa Drugs Hives     Rash/hives        Latex Allergy: NO    Social History   Substance Use Topics     Smoking status: Never Smoker     Smokeless tobacco: Never Used     Alcohol use Yes      Comment: once per month     History   Drug Use No       REVIEW OF SYSTEMS:                                                    C: NEGATIVE for fever, chills, change in weight  I: NEGATIVE for worrisome rashes, moles or lesions  E: NEGATIVE for vision changes or irritation  E/M: NEGATIVE for ear, mouth and throat problems  R: NEGATIVE for significant cough or SOB  CV: NEGATIVE for chest pain, palpitations or peripheral edema  GI: NEGATIVE for nausea, abdominal pain, heartburn, or change in bowel habits  : stress incontinence   M: NEGATIVE for significant arthralgias or myalgia  N: NEGATIVE for weakness, dizziness or paresthesias  E: NEGATIVE for temperature intolerance, skin/hair changes  H: NEGATIVE for bleeding problems  P: NEGATIVE for changes in mood or affect    EXAM:                                                    /72 (BP Location: Left arm, Patient Position: Chair, Cuff Size: Adult Large)  Pulse 55  Temp 97  F (36.1  C) (Oral)  Resp 12  Ht 5' 5.25\" (1.657 m)  Wt 172 lb (78 kg)  LMP 06/03/2017  SpO2 97%  BMI 28.4 kg/m2    GENERAL APPEARANCE: alert and no distress     HENT: ear canals and TM's normal and nose and mouth without ulcers or lesions     RESP: lungs clear to auscultation - no rales, rhonchi or wheezes     CV: regular rates and rhythm, normal S1 S2, no S3 or S4 and no murmur, click or rub     ABDOMEN:  soft, nontender, no HSM or masses and bowel sounds normal     MS: extremities normal- no gross deformities noted, no " evidence of inflammation in joints, FROM in all extremities.     SKIN: no suspicious lesions or rashes     NEURO: Normal strength and tone, sensory exam grossly normal, mentation intact and speech normal     PSYCH: mentation appears normal. and affect normal/bright    DIAGNOSTICS:                                                    EKG: sinus bradycardia, normal axis, normal intervals, no acute ST/T changes c/w ischemia, no LVH by voltage criteria, unchanged from previous tracings    Recent Labs   Lab Test  02/14/17   1015  08/18/16   0717  08/17/16   1530  08/17/16   0851  08/12/16   0936  04/08/16   1435   HGB  12.6  11.3*   --    --   13.0  12.9   PLT  219   --   236   --    --   287   INR   --    --    --    --    --   0.96   NA   --   139   --    --   138  138   POTASSIUM   --   4.2   --   4.0  4.3  3.8   CR   --    --   0.83   --   0.75  0.68   A1C   --    --    --    --    --   5.4        IMPRESSION:                                                    Reason for surgery/procedure: incontinence- needs help with bladder sling   Diagnosis/reason for consult: pre op     The proposed surgical procedure is considered INTERMEDIATE risk.    REVISED CARDIAC RISK INDEX  The patient has the following serious cardiovascular risks for perioperative complications such as (MI, PE, VFib and 3  AV Block):  No serious cardiac risks  INTERPRETATION: 1 risks: Class II (low risk - 0.9% complication rate)    The patient has the following additional risks for perioperative complications:  No identified additional risks      ICD-10-CM    1. Preoperative examination Z01.818 EKG 12-lead complete w/read - Clinics   2. Female stress incontinence N39.3    3. Bariatric surgery status Z98.84        RECOMMENDATIONS:                                                          --Patient is to take no medication day of surgery     APPROVAL GIVEN to proceed with proposed procedure, without further diagnostic evaluation       Signed Electronically  by: SHAWN Love CNP    Copy of this evaluation report is provided to requesting physician.    [facility] Preop Guidelines

## 2017-06-19 ENCOUNTER — HOSPITAL ENCOUNTER (OUTPATIENT)
Facility: CLINIC | Age: 52
Discharge: HOME | End: 2017-06-19
Attending: UROLOGY | Admitting: UROLOGY
Payer: COMMERCIAL

## 2017-06-19 ENCOUNTER — ANESTHESIA EVENT (OUTPATIENT)
Dept: SURGERY | Facility: CLINIC | Age: 52
End: 2017-06-19
Payer: COMMERCIAL

## 2017-06-19 ENCOUNTER — ANESTHESIA (OUTPATIENT)
Dept: SURGERY | Facility: CLINIC | Age: 52
End: 2017-06-19
Payer: COMMERCIAL

## 2017-06-19 VITALS
HEIGHT: 65 IN | TEMPERATURE: 98.2 F | DIASTOLIC BLOOD PRESSURE: 65 MMHG | OXYGEN SATURATION: 99 % | SYSTOLIC BLOOD PRESSURE: 132 MMHG | BODY MASS INDEX: 28.62 KG/M2 | RESPIRATION RATE: 16 BRPM | WEIGHT: 171.8 LBS

## 2017-06-19 DIAGNOSIS — N39.3 FEMALE STRESS INCONTINENCE: Primary | ICD-10-CM

## 2017-06-19 LAB — HCG SERPL QL: NEGATIVE

## 2017-06-19 PROCEDURE — 25000125 ZZHC RX 250: Performed by: UROLOGY

## 2017-06-19 PROCEDURE — 25000128 H RX IP 250 OP 636: Performed by: ANESTHESIOLOGY

## 2017-06-19 PROCEDURE — 25800025 ZZH RX 258: Performed by: UROLOGY

## 2017-06-19 PROCEDURE — 37000009 ZZH ANESTHESIA TECHNICAL FEE, EACH ADDTL 15 MIN: Performed by: UROLOGY

## 2017-06-19 PROCEDURE — 25000132 ZZH RX MED GY IP 250 OP 250 PS 637: Performed by: UROLOGY

## 2017-06-19 PROCEDURE — 71000012 ZZH RECOVERY PHASE 1 LEVEL 1 FIRST HR: Performed by: UROLOGY

## 2017-06-19 PROCEDURE — 25000566 ZZH SEVOFLURANE, EA 15 MIN: Performed by: UROLOGY

## 2017-06-19 PROCEDURE — 36000058 ZZH SURGERY LEVEL 3 EA 15 ADDTL MIN: Performed by: UROLOGY

## 2017-06-19 PROCEDURE — 57288 REPAIR BLADDER DEFECT: CPT | Performed by: UROLOGY

## 2017-06-19 PROCEDURE — 25000128 H RX IP 250 OP 636: Performed by: NURSE ANESTHETIST, CERTIFIED REGISTERED

## 2017-06-19 PROCEDURE — 84703 CHORIONIC GONADOTROPIN ASSAY: CPT | Performed by: ANESTHESIOLOGY

## 2017-06-19 PROCEDURE — 36000056 ZZH SURGERY LEVEL 3 1ST 30 MIN: Performed by: UROLOGY

## 2017-06-19 PROCEDURE — 40000305 ZZH STATISTIC PRE PROC ASSESS I: Performed by: UROLOGY

## 2017-06-19 PROCEDURE — 71000027 ZZH RECOVERY PHASE 2 EACH 15 MINS: Performed by: UROLOGY

## 2017-06-19 PROCEDURE — 36415 COLL VENOUS BLD VENIPUNCTURE: CPT | Performed by: ANESTHESIOLOGY

## 2017-06-19 PROCEDURE — 25000128 H RX IP 250 OP 636: Performed by: UROLOGY

## 2017-06-19 PROCEDURE — C1771 REP DEV, URINARY, W/SLING: HCPCS | Performed by: UROLOGY

## 2017-06-19 PROCEDURE — 25000125 ZZHC RX 250: Performed by: NURSE ANESTHETIST, CERTIFIED REGISTERED

## 2017-06-19 PROCEDURE — 27210794 ZZH OR GENERAL SUPPLY STERILE: Performed by: UROLOGY

## 2017-06-19 PROCEDURE — 37000008 ZZH ANESTHESIA TECHNICAL FEE, 1ST 30 MIN: Performed by: UROLOGY

## 2017-06-19 DEVICE — MESH SLING SINGLE INCISION ALTIS 519650: Type: IMPLANTABLE DEVICE | Site: VAGINA | Status: FUNCTIONAL

## 2017-06-19 RX ORDER — SODIUM CHLORIDE, SODIUM LACTATE, POTASSIUM CHLORIDE, CALCIUM CHLORIDE 600; 310; 30; 20 MG/100ML; MG/100ML; MG/100ML; MG/100ML
500 INJECTION, SOLUTION INTRAVENOUS CONTINUOUS
Status: DISCONTINUED | OUTPATIENT
Start: 2017-06-19 | End: 2017-06-19 | Stop reason: HOSPADM

## 2017-06-19 RX ORDER — ONDANSETRON 2 MG/ML
INJECTION INTRAMUSCULAR; INTRAVENOUS PRN
Status: DISCONTINUED | OUTPATIENT
Start: 2017-06-19 | End: 2017-06-19

## 2017-06-19 RX ORDER — NALOXONE HYDROCHLORIDE 0.4 MG/ML
.1-.4 INJECTION, SOLUTION INTRAMUSCULAR; INTRAVENOUS; SUBCUTANEOUS
Status: DISCONTINUED | OUTPATIENT
Start: 2017-06-19 | End: 2017-06-19 | Stop reason: HOSPADM

## 2017-06-19 RX ORDER — CEFAZOLIN SODIUM 2 G/100ML
2 INJECTION, SOLUTION INTRAVENOUS
Status: COMPLETED | OUTPATIENT
Start: 2017-06-19 | End: 2017-06-19

## 2017-06-19 RX ORDER — HYDROCODONE BITARTRATE AND ACETAMINOPHEN 5; 325 MG/1; MG/1
1-2 TABLET ORAL EVERY 4 HOURS PRN
Qty: 10 TABLET | Refills: 0 | Status: SHIPPED | OUTPATIENT
Start: 2017-06-19 | End: 2017-07-13

## 2017-06-19 RX ORDER — DEXAMETHASONE SODIUM PHOSPHATE 4 MG/ML
INJECTION, SOLUTION INTRA-ARTICULAR; INTRALESIONAL; INTRAMUSCULAR; INTRAVENOUS; SOFT TISSUE PRN
Status: DISCONTINUED | OUTPATIENT
Start: 2017-06-19 | End: 2017-06-19

## 2017-06-19 RX ORDER — FENTANYL CITRATE 50 UG/ML
25-50 INJECTION, SOLUTION INTRAMUSCULAR; INTRAVENOUS
Status: DISCONTINUED | OUTPATIENT
Start: 2017-06-19 | End: 2017-06-19 | Stop reason: HOSPADM

## 2017-06-19 RX ORDER — METOPROLOL TARTRATE 1 MG/ML
1-2 INJECTION, SOLUTION INTRAVENOUS EVERY 5 MIN PRN
Status: DISCONTINUED | OUTPATIENT
Start: 2017-06-19 | End: 2017-06-19 | Stop reason: HOSPADM

## 2017-06-19 RX ORDER — ONDANSETRON 4 MG/1
4 TABLET, ORALLY DISINTEGRATING ORAL EVERY 30 MIN PRN
Status: DISCONTINUED | OUTPATIENT
Start: 2017-06-19 | End: 2017-06-19 | Stop reason: HOSPADM

## 2017-06-19 RX ORDER — PROPOFOL 10 MG/ML
INJECTION, EMULSION INTRAVENOUS CONTINUOUS PRN
Status: DISCONTINUED | OUTPATIENT
Start: 2017-06-19 | End: 2017-06-19

## 2017-06-19 RX ORDER — SODIUM CHLORIDE, SODIUM LACTATE, POTASSIUM CHLORIDE, CALCIUM CHLORIDE 600; 310; 30; 20 MG/100ML; MG/100ML; MG/100ML; MG/100ML
INJECTION, SOLUTION INTRAVENOUS CONTINUOUS
Status: DISCONTINUED | OUTPATIENT
Start: 2017-06-19 | End: 2017-06-19 | Stop reason: HOSPADM

## 2017-06-19 RX ORDER — HYDROMORPHONE HYDROCHLORIDE 1 MG/ML
.3-.5 INJECTION, SOLUTION INTRAMUSCULAR; INTRAVENOUS; SUBCUTANEOUS EVERY 10 MIN PRN
Status: DISCONTINUED | OUTPATIENT
Start: 2017-06-19 | End: 2017-06-19 | Stop reason: HOSPADM

## 2017-06-19 RX ORDER — PROPOFOL 10 MG/ML
INJECTION, EMULSION INTRAVENOUS PRN
Status: DISCONTINUED | OUTPATIENT
Start: 2017-06-19 | End: 2017-06-19

## 2017-06-19 RX ORDER — LIDOCAINE 40 MG/G
CREAM TOPICAL
Status: DISCONTINUED | OUTPATIENT
Start: 2017-06-19 | End: 2017-06-19 | Stop reason: HOSPADM

## 2017-06-19 RX ORDER — ALBUTEROL SULFATE 0.83 MG/ML
2.5 SOLUTION RESPIRATORY (INHALATION) EVERY 4 HOURS PRN
Status: DISCONTINUED | OUTPATIENT
Start: 2017-06-19 | End: 2017-06-19 | Stop reason: HOSPADM

## 2017-06-19 RX ORDER — FENTANYL CITRATE 50 UG/ML
INJECTION, SOLUTION INTRAMUSCULAR; INTRAVENOUS PRN
Status: DISCONTINUED | OUTPATIENT
Start: 2017-06-19 | End: 2017-06-19

## 2017-06-19 RX ORDER — HYDROCODONE BITARTRATE AND ACETAMINOPHEN 5; 325 MG/1; MG/1
1-2 TABLET ORAL ONCE
Status: COMPLETED | OUTPATIENT
Start: 2017-06-19 | End: 2017-06-19

## 2017-06-19 RX ORDER — GLYCOPYRROLATE 0.2 MG/ML
INJECTION, SOLUTION INTRAMUSCULAR; INTRAVENOUS PRN
Status: DISCONTINUED | OUTPATIENT
Start: 2017-06-19 | End: 2017-06-19

## 2017-06-19 RX ORDER — CEFAZOLIN SODIUM 1 G/3ML
1 INJECTION, POWDER, FOR SOLUTION INTRAMUSCULAR; INTRAVENOUS SEE ADMIN INSTRUCTIONS
Status: DISCONTINUED | OUTPATIENT
Start: 2017-06-19 | End: 2017-06-19 | Stop reason: HOSPADM

## 2017-06-19 RX ORDER — MEPERIDINE HYDROCHLORIDE 25 MG/ML
12.5 INJECTION INTRAMUSCULAR; INTRAVENOUS; SUBCUTANEOUS
Status: DISCONTINUED | OUTPATIENT
Start: 2017-06-19 | End: 2017-06-19 | Stop reason: HOSPADM

## 2017-06-19 RX ORDER — PROMETHAZINE HYDROCHLORIDE 25 MG/ML
6.25 INJECTION, SOLUTION INTRAMUSCULAR; INTRAVENOUS
Status: DISCONTINUED | OUTPATIENT
Start: 2017-06-19 | End: 2017-06-19 | Stop reason: HOSPADM

## 2017-06-19 RX ORDER — LIDOCAINE HYDROCHLORIDE 10 MG/ML
INJECTION, SOLUTION INFILTRATION; PERINEURAL PRN
Status: DISCONTINUED | OUTPATIENT
Start: 2017-06-19 | End: 2017-06-19

## 2017-06-19 RX ORDER — ONDANSETRON 2 MG/ML
4 INJECTION INTRAMUSCULAR; INTRAVENOUS EVERY 30 MIN PRN
Status: DISCONTINUED | OUTPATIENT
Start: 2017-06-19 | End: 2017-06-19 | Stop reason: HOSPADM

## 2017-06-19 RX ADMIN — GLYCOPYRROLATE 0.2 MG: 0.2 INJECTION, SOLUTION INTRAMUSCULAR; INTRAVENOUS at 09:59

## 2017-06-19 RX ADMIN — PROPOFOL 200 MG: 10 INJECTION, EMULSION INTRAVENOUS at 09:59

## 2017-06-19 RX ADMIN — CEFAZOLIN SODIUM 2 G: 2 INJECTION, SOLUTION INTRAVENOUS at 09:55

## 2017-06-19 RX ADMIN — SODIUM CHLORIDE, POTASSIUM CHLORIDE, SODIUM LACTATE AND CALCIUM CHLORIDE: 600; 310; 30; 20 INJECTION, SOLUTION INTRAVENOUS at 10:53

## 2017-06-19 RX ADMIN — ONDANSETRON 4 MG: 2 INJECTION INTRAMUSCULAR; INTRAVENOUS at 10:13

## 2017-06-19 RX ADMIN — SODIUM CHLORIDE, SODIUM LACTATE, POTASSIUM CHLORIDE, CALCIUM CHLORIDE: 600; 310; 30; 20 INJECTION, SOLUTION INTRAVENOUS at 09:15

## 2017-06-19 RX ADMIN — HYDROCODONE BITARTRATE AND ACETAMINOPHEN 1 TABLET: 5; 325 TABLET ORAL at 12:28

## 2017-06-19 RX ADMIN — DEXAMETHASONE SODIUM PHOSPHATE 4 MG: 4 INJECTION, SOLUTION INTRA-ARTICULAR; INTRALESIONAL; INTRAMUSCULAR; INTRAVENOUS; SOFT TISSUE at 09:59

## 2017-06-19 RX ADMIN — MIDAZOLAM HYDROCHLORIDE 2 MG: 1 INJECTION, SOLUTION INTRAMUSCULAR; INTRAVENOUS at 09:54

## 2017-06-19 RX ADMIN — LIDOCAINE HYDROCHLORIDE 30 MG: 10 INJECTION, SOLUTION INFILTRATION; PERINEURAL at 09:59

## 2017-06-19 RX ADMIN — FENTANYL CITRATE 100 MCG: 50 INJECTION, SOLUTION INTRAMUSCULAR; INTRAVENOUS at 09:59

## 2017-06-19 RX ADMIN — PROPOFOL 100 MCG/KG/MIN: 10 INJECTION, EMULSION INTRAVENOUS at 10:01

## 2017-06-19 NOTE — DISCHARGE INSTRUCTIONS
GENERAL ANESTHESIA OR SEDATION ADULT DISCHARGE INSTRUCTIONS   SPECIAL PRECAUTIONS FOR 24 HOURS AFTER SURGERY    IT IS NOT UNUSUAL TO FEEL LIGHT-HEADED OR FAINT, UP TO 24 HOURS AFTER SURGERY OR WHILE TAKING PAIN MEDICATION.  IF YOU HAVE THESE SYMPTOMS; SIT FOR A FEW MINUTES BEFORE STANDING AND HAVE SOMEONE ASSIST YOU WHEN YOU GET UP TO WALK OR USE THE BATHROOM.    YOU SHOULD REST AND RELAX FOR THE NEXT 24 HOURS AND YOU MUST MAKE ARRANGEMENTS TO HAVE SOMEONE STAY WITH YOU FOR AT LEAST 24 HOURS AFTER YOUR DISCHARGE.  AVOID HAZARDOUS AND STRENUOUS ACTIVITIES.  DO NOT MAKE IMPORTANT DECISIONS FOR 24 HOURS.    DO NOT DRIVE ANY VEHICLE OR OPERATE MECHANICAL EQUIPMENT FOR 24 HOURS FOLLOWING THE END OF YOUR SURGERY.  EVEN THOUGH YOU MAY FEEL NORMAL, YOUR REACTIONS MAY BE AFFECTED BY THE MEDICATION YOU HAVE RECEIVED.    DO NOT DRINK ALCOHOLIC BEVERAGES FOR 24 HOURS FOLLOWING YOUR SURGERY.    DRINK CLEAR LIQUIDS (APPLE JUICE, GINGER ALE, 7-UP, BROTH, ETC.).  PROGRESS TO YOUR REGULAR DIET AS YOU FEEL ABLE.    YOU MAY HAVE A DRY MOUTH, A SORE THROAT, MUSCLES ACHES OR TROUBLE SLEEPING.  THESE SHOULD GO AWAY AFTER 24 HOURS.    CALL YOUR DOCTOR FOR ANY OF THE FOLLOWING:  SIGNS OF INFECTION (FEVER, GROWING TENDERNESS AT THE SURGERY SITE, A LARGE AMOUNT OF DRAINAGE OR BLEEDING, SEVERE PAIN, FOUL-SMELLING DRAINAGE, REDNESS OR SWELLING.    IT HAS BEEN OVER 8 TO 10 HOURS SINCE SURGERY AND YOU ARE STILL NOT ABLE TO URINATE (PASS WATER).       PUBOVAGINAL SLING DISCHARGE INSTRUCTIONS  [facility] FOR BLADDER CONTROL:  [phone]  DR. JACK MOREL M.D.    CONGRATULATIONS; YOU ARE ON YOUR WAY TO IMPROVED BLADDER CONTROL!  HERE ARE A FEW THINGS TO REMEMBER AS YOU RECOVER FROM YOUR SURGERY.    WHAT YOUR BLADDER MIGHT FEEL LIKE:  YOU WILL LIKELY HAVE SOME DISCOMFORT AT FIRST.  MANY WOMEN ADDRESS THEIR DISCOMFORT WITH REGULAR TYLENOL AS EARLY AS THE DAY OF SURGERY.  A HEATING PAD, PLACED ON THE LOWER ABDOMEN, CAN ALSO BE HELPFUL FOR  "GENERAL SORENESS.  YOU CAN USE THE PRESCRIBED NARCOTIC IF YOU'RE EXPERIENCING STRONG DISCOMFORT, BUT WE STRONGLY ENCOURAGE YOU TO TAKE AS FEW AS ABSOLUTELY NECESSARY.  AVOID THE USE OF ASPIRIN, MOTRIN, ADVIL, ALEVE AND OTHER OVER-THE-COUNTER DRUGS FOR AT LEAST ONE WEEK AFTER SURGERY AS THESE CAN CAUSE BLEEDING DURING THIS TIME PERIOD.      SOME WOMEN REPORT THAT THEIR URINE STREAM IS SLOWER THAN USUAL AFTER BLADDER SURGERY OR THAT IT SPRAYS IN A DIFFERENT DIRECTION; THIS IS PART OF THE NORMAL HEALING PROCESS AND WILL CONTINUE TO IMPROVE WITH TIME.    YOU MAY FEEL THE NEED TO EMPTY YOUR BLADDER \"RIGHT NOW.\"  YOU MIGHT ALSO FEEL PELVIC PRESSURE WHEN YOU EXERT YOURSELF.  THESE GENERALLY IMPROVE WITH TIME AND HEALING.      INCISION CARE:  YOU MAY SHOWER THE DAY AFTER SURGERY.       THE SMALL INCISION IN YOUR VAGINA IS CLOSED WITH DISSOLVABLE STITCHES. THE STITCHES WILL DISSOLVE ON THEIR OWN IN 4-6 WEEKS.  IT IS NORMAL TO HAVE AN INCREASED VAGINAL DISCHARGE AS THE VAGINAL TISSUE IS HEALING.    YOU MAY NOTICE VAGINAL SPOTTING FOR UP TO 6 WEEKS.  IT CAN BE ON AND OFF, OR IT MAY BE ONGOING.  IF IT IS MORE THAN A SMALL AMOUNT, CALL OUR CLINIC.    ACTIVITY AND RESTRICTIONS:  DAY OF SURGERY:  REST AT HOME.      YOU MAY FEEL A BIT WEAK AND MAY TIRE A BIT MORE EASILY.  LISTEN TO YOUR BODY AND REST AS NEEDED.    YOU MAY WALK ABOUT THE HOUSE AS NEEDED AND USE STAIRS ON A LIMITED BASIS.      DAY 1 AFTER SURGERY:  REST AT HOME.      DAY 2 AFTER SURGERY:  RETURN TO WORK IF YOUR JOB IS PHYSICALLY EASY, SUCH AS DESK OR COMPUTER WORK.    WEEK 1 AFTER SURGERY:  LIGHT EXERCISE IS ENCOURAGED, SUCH AS WALKING, CHORES AROUND THE HOUSE AND EVEN GENTLE USE OF THE TREADMILL, ELLIPTICAL OR CIRCUIT TRAINING EXERCISES.    NO LIFTING ANYTHING MORE THAN 15 POUNDS.  AVOID HEAVY ACTIVITY SUCH AS GOLF, VACUUMING AND SHOVELING SNOW FOR ONE MONTH.    SHOWERS MAY BEGIN THE DAY AFTER SURGERY.  WE SUGGEST THAT YOU REFRAIN FROM SWIMMING AND BATH TUBS FOR ONE " MONTH.    MOST WOMEN CAN DRIVE AFTER 2 DAYS.  DO NOT DRIVE UNTIL YOU FEEL 100% ABLE TO RESPOND TO AN EMERGENCY ON THE ROAD.  NEVER DRIVE WHILE TAKING NARCOTIC MEDICINE.    AVOID ANYTHING IN YOUR VAGINA FR THE ENTIRE 4 WEEKS.  THIS INCLUDES TAMPONS AND INTERCOURSE.  IF YOU HAVE BEEN PRESCRIBED VAGINAL ESTROGEN CREAM, YOU MAY APPLY THIS TO THE EXTERNAL AREA WITH A FINGER.  DO NOT INSERT THE APPLICATOR INTO YOUR VAGINA.    WEEK 4 AFTER SURGERY:  FIRST FOLLOW-UP APPOINTMENT WITH THE NURSE PRACTIONER.    YOU MAY RESUME FULL EXERCISE, SWIM, TAKE TUB BATHS AND HAVE INTERCOURSE.    IF YOU HAVE A JOB THAT IS PHYSICALLY DEMANDING, YOU MAY GO BACK TO WORK IN ABOUT 4 WEEKS.    4 MONTHS AFTER SURGERY:  SECOND FOLLOW-UP APPOINTMENT WITH DR. MOREL.    FOLLOW UP APPOINTMENT:  YOU SHOULD BE SCHEDULED FOR YOUR FIRST POST-OP APPOINTMENT ABOUT 4 WEEKS AFTER YOUR SURGERY.  IF YOU DO NOT HAVE THIS ON YOUR CALENDAR, PLEASE CONTACT OUR OFFICE.  PLEASE ARRIVE WITH A PARTIALLY FULL BLADDER AS YOU WILL BE ASKED TO LEAVE A URINE SAMPLE.  DURING THE VISIT, YOU WILL BE ASKED HOW THINGS ARE GOING.  A GENTLE PELVIC EXAM WILL BE PERFORMED.  YOU WILL NOT HAVE ANY TESTING DONE INSIDE YOUR BLADDER AT THIS APPOINTMENT.     GENERAL REMINDER:  PLEASE KEEP IN MIND THAT THE OVERRIDING PRINCIPLE DURING THE FOUR-WEEK RECOVERY PROCESS IS TO ALLOW YOUR BODY TO HEAL AS WELL AS POSSIBLE TO ENSURE THE VERY BEST POSSIBLE LONG-TERM RESULT.  TO ACHIEVE THIS SUCCESS OUR EXPERIENCE HAS SHOWN THAT PATIENTS DO BEST IF THEY CHALLENGE THEMSELVES TO DO THE LEAST POSSIBLE PHYSICAL ACTIVITY, NOT THE MOST.  MANY PATIENTS HAVE SHARED THAT THEY ACTUALLY TREASURED THIS TIME TO GET CAUGHT UP ON MANY LESS PHYSICAL ACTIVITIES SUCH AS READING, WRITING LETTERS, CRAFTS AND SPENDING QUALITY TIME WITH THEIR FAMILIES AND FRIENDS.    WHAT IF I HAVE QUESTIONS?  PLEASE CONTACT OUR OFFICE DIRECTLY IF YOU HAVE ANY QUESTIONS [phone].

## 2017-06-19 NOTE — ANESTHESIA PREPROCEDURE EVALUATION
Anesthesia Evaluation     .             ROS/MED HX    ENT/Pulmonary:  - neg pulmonary ROS     Neurologic:  - neg neurologic ROS     Cardiovascular:     (+) hypertension----. : . . . :. .       METS/Exercise Tolerance:     Hematologic:  - neg hematologic  ROS       Musculoskeletal:  - neg musculoskeletal ROS       GI/Hepatic:     (+) Other GI/Hepatic S/P Gastric bypass      Renal/Genitourinary:  - ROS Renal section negative       Endo: Comment: .Body mass index is 28.37 kg/(m^2).   - neg endo ROS       Psychiatric:  - neg psychiatric ROS       Infectious Disease:  - neg infectious disease ROS       Malignancy:         Other: Comment: .Lab Test        02/14/17     08/18/16     08/17/16     08/12/16     04/08/16     04/02/15                       1015          0717          1530          0936          1435          0831          WBC          6.0           --           --           --          9.0          6.5           HGB          12.6         11.3*         --          13.0         12.9         12.5          MCV          93            --           --           --          88           88            PLT          219           --          236           --          287          269           INR           --           --           --           --          0.96          --            Lab Test        08/18/16     08/17/16     08/17/16     08/12/16     04/08/16     04/02/15                       0717          1530          0851          0936          1435          0831          NA           139           --           --          138          138          142           POTASSIUM    4.2           --          4.0          4.3          3.8          3.8           CHLORIDE     106           --           --          106          104          105           CO2          29            --           --          30           24           26            BUN           --           --           --          16           15           17             CR            --          0.83          --          0.75         0.68         0.71          ANIONGAP     4             --           --          2*           10           11            GREGORY           --           --           --          9.2          8.9          8.9           GLC           --           --           --          87           107*         83                                 Physical Exam  Normal systems: cardiovascular and pulmonary    Airway   Mallampati: II    Dental     Cardiovascular   Rhythm and rate: regular and normal      Pulmonary    breath sounds clear to auscultation                    Anesthesia Plan      History & Physical Review  History and physical reviewed and following examination; no interval change.    ASA Status:  2 .        Plan for General and LMA with Intravenous and Propofol induction. Maintenance will be Inhalation and Balanced.    PONV prophylaxis:  Ondansetron (or other 5HT-3) and Dexamethasone or Solumedrol       Postoperative Care      Consents  Anesthetic plan, risks, benefits and alternatives discussed with:  Patient or representative..                          .

## 2017-06-19 NOTE — IP AVS SNAPSHOT
[facility] PreOP/PostOP    [address]    [facility] [address]    Phone:  [phone]    Fax:  [phone]                                       After Visit Summary   6/19/2017    Nicole Tinajero    MRN: [id number]           After Visit Summary Signature Page     I have received my discharge instructions, and my questions have been answered. I have discussed any challenges I see with this plan with the nurse or doctor.    ..........................................................................................................................................  Patient/Patient Representative Signature      ..........................................................................................................................................  Patient Representative Print Name and Relationship to Patient    ..................................................               ................................................  Date                                            Time    ..........................................................................................................................................  Reviewed by Signature/Title    ...................................................              ..............................................  Date                                                            Time

## 2017-06-19 NOTE — ANESTHESIA CARE TRANSFER NOTE
Patient: Nicole Tinajero    Procedure(s):  pubovaginal sling (altis) - Wound Class: II-Clean Contaminated    Diagnosis: urinary incontinence  Diagnosis Additional Information: No value filed.    Anesthesia Type:   General, LMA     Note:  Airway :Face Mask  Patient transferred to:PACU  Comments: Patient with spontaneous respirations and adequate tidal volumes. Patient awake and responsive. LMA removed in OR to 8 L face tent. To PACU ventilating well. VSS. Report given.      Vitals: (Last set prior to Anesthesia Care Transfer)    CRNA VITALS  6/19/2017 1006 - 6/19/2017 1041      6/19/2017             Pulse: 85    SpO2: 96 %                Electronically Signed By: SHAWN Patrick CRNA  June 19, 2017  10:41 AM

## 2017-06-19 NOTE — IP AVS SNAPSHOT
MRN:[id number]                      After Visit Summary   6/19/2017    Nicole Tinajero    MRN: [id number]           Thank you!     Thank you for choosing [facility] for your care. Our goal is always to provide you with excellent care. Hearing back from our patients is one way we can continue to improve our services. Please take a few minutes to complete the written survey that you may receive in the mail after you visit. If you would like to speak to someone directly about your visit please contact Patient Relations at [phone]. Thank you!          Patient Information     Date Of Birth          1965        About your hospital stay     You were admitted on:  June 19, 2017 You last received care in the:  [facility] PreOP/PostOP    You were discharged on:  June 19, 2017       Who to Call     For medical emergencies, please call 911.  For non-urgent questions about your medical care, please call your primary care provider or clinic, [phone]  For questions related to your surgery, please call your surgery clinic        Attending Provider     Provider Specialty    Raad Rhoades MD Urology       Primary Care Provider Office Phone # Fax #    June Narvaez [phone]      Your next 10 appointments already scheduled     Jun 26, 2017  3:40 PM CDT   [facility] MD   [facility]    [facility] [address] [phone]           Bring a current list of meds and any records pertaining to this visit.  For Physicals, please bring immunization records and any forms needing to be filled out.  Please arrive 10 minutes early to complete paperwork.            Jul 13, 2017 11:15 AM CDT   Return Visit with Raad Rhoades MD   [facility] ([facility]    [address] [place] [address]  [place] [id number] [phone]            Aug 18,  2017 10:00 AM CDT   Annual Visit with Barbra Mclain PA-C   [facility] ([facility])    [address] [id number] [phone]            Aug 18, 2017 10:30 AM CDT   Annual Visit with Logan Connell Diet 2, RD   [facility])    [address] [id number] [phone]              Further instructions from your care team       GENERAL ANESTHESIA OR SEDATION ADULT DISCHARGE INSTRUCTIONS   SPECIAL PRECAUTIONS FOR 24 HOURS AFTER SURGERY    IT IS NOT UNUSUAL TO FEEL LIGHT-HEADED OR FAINT, UP TO 24 HOURS AFTER SURGERY OR WHILE TAKING PAIN MEDICATION.  IF YOU HAVE THESE SYMPTOMS; SIT FOR A FEW MINUTES BEFORE STANDING AND HAVE SOMEONE ASSIST YOU WHEN YOU GET UP TO WALK OR USE THE BATHROOM.    YOU SHOULD REST AND RELAX FOR THE NEXT 24 HOURS AND YOU MUST MAKE ARRANGEMENTS TO HAVE SOMEONE STAY WITH YOU FOR AT LEAST 24 HOURS AFTER YOUR DISCHARGE.  AVOID HAZARDOUS AND STRENUOUS ACTIVITIES.  DO NOT MAKE IMPORTANT DECISIONS FOR 24 HOURS.    DO NOT DRIVE ANY VEHICLE OR OPERATE MECHANICAL EQUIPMENT FOR 24 HOURS FOLLOWING THE END OF YOUR SURGERY.  EVEN THOUGH YOU MAY FEEL NORMAL, YOUR REACTIONS MAY BE AFFECTED BY THE MEDICATION YOU HAVE RECEIVED.    DO NOT DRINK ALCOHOLIC BEVERAGES FOR 24 HOURS FOLLOWING YOUR SURGERY.    DRINK CLEAR LIQUIDS (APPLE JUICE, GINGER ALE, 7-UP, BROTH, ETC.).  PROGRESS TO YOUR REGULAR DIET AS YOU FEEL ABLE.    YOU MAY HAVE A DRY MOUTH, A SORE THROAT, MUSCLES ACHES OR TROUBLE SLEEPING.  THESE SHOULD GO AWAY AFTER 24 HOURS.    CALL YOUR DOCTOR FOR ANY OF THE FOLLOWING:  SIGNS OF INFECTION (FEVER, GROWING TENDERNESS AT THE SURGERY SITE, A LARGE AMOUNT OF DRAINAGE OR BLEEDING, SEVERE PAIN, FOUL-SMELLING DRAINAGE, REDNESS OR SWELLING.    IT HAS BEEN OVER 8 TO 10 HOURS SINCE SURGERY AND YOU ARE STILL NOT ABLE TO  "URINATE (PASS WATER).       PUBOVAGINAL SLING DISCHARGE INSTRUCTIONS  [facility] FOR BLADDER CONTROL:  [phone]  DR. JACK MOREL M.D.    CONGRATULATIONS; YOU ARE ON YOUR WAY TO IMPROVED BLADDER CONTROL!  HERE ARE A FEW THINGS TO REMEMBER AS YOU RECOVER FROM YOUR SURGERY.    WHAT YOUR BLADDER MIGHT FEEL LIKE:  YOU WILL LIKELY HAVE SOME DISCOMFORT AT FIRST.  MANY WOMEN ADDRESS THEIR DISCOMFORT WITH REGULAR TYLENOL AS EARLY AS THE DAY OF SURGERY.  A HEATING PAD, PLACED ON THE LOWER ABDOMEN, CAN ALSO BE HELPFUL FOR GENERAL SORENESS.  YOU CAN USE THE PRESCRIBED NARCOTIC IF YOU'RE EXPERIENCING STRONG DISCOMFORT, BUT WE STRONGLY ENCOURAGE YOU TO TAKE AS FEW AS ABSOLUTELY NECESSARY.  AVOID THE USE OF ASPIRIN, MOTRIN, ADVIL, ALEVE AND OTHER OVER-THE-COUNTER DRUGS FOR AT LEAST ONE WEEK AFTER SURGERY AS THESE CAN CAUSE BLEEDING DURING THIS TIME PERIOD.      SOME WOMEN REPORT THAT THEIR URINE STREAM IS SLOWER THAN USUAL AFTER BLADDER SURGERY OR THAT IT SPRAYS IN A DIFFERENT DIRECTION; THIS IS PART OF THE NORMAL HEALING PROCESS AND WILL CONTINUE TO IMPROVE WITH TIME.    YOU MAY FEEL THE NEED TO EMPTY YOUR BLADDER \"RIGHT NOW.\"  YOU MIGHT ALSO FEEL PELVIC PRESSURE WHEN YOU EXERT YOURSELF.  THESE GENERALLY IMPROVE WITH TIME AND HEALING.      INCISION CARE:  YOU MAY SHOWER THE DAY AFTER SURGERY.       THE SMALL INCISION IN YOUR VAGINA IS CLOSED WITH DISSOLVABLE STITCHES. THE STITCHES WILL DISSOLVE ON THEIR OWN IN 4-6 WEEKS.  IT IS NORMAL TO HAVE AN INCREASED VAGINAL DISCHARGE AS THE VAGINAL TISSUE IS HEALING.    YOU MAY NOTICE VAGINAL SPOTTING FOR UP TO 6 WEEKS.  IT CAN BE ON AND OFF, OR IT MAY BE ONGOING.  IF IT IS MORE THAN A SMALL AMOUNT, CALL OUR CLINIC.    ACTIVITY AND RESTRICTIONS:  DAY OF SURGERY:  REST AT HOME.      YOU MAY FEEL A BIT WEAK AND MAY TIRE A BIT MORE EASILY.  LISTEN TO YOUR BODY AND REST AS NEEDED.    YOU MAY WALK ABOUT THE HOUSE AS NEEDED AND USE STAIRS ON A LIMITED BASIS.      DAY 1 AFTER SURGERY:  " REST AT HOME.      DAY 2 AFTER SURGERY:  RETURN TO WORK IF YOUR JOB IS PHYSICALLY EASY, SUCH AS DESK OR COMPUTER WORK.    WEEK 1 AFTER SURGERY:  LIGHT EXERCISE IS ENCOURAGED, SUCH AS WALKING, CHORES AROUND THE HOUSE AND EVEN GENTLE USE OF THE TREADMILL, ELLIPTICAL OR CIRCUIT TRAINING EXERCISES.    NO LIFTING ANYTHING MORE THAN 15 POUNDS.  AVOID HEAVY ACTIVITY SUCH AS GOLF, VACUUMING AND SHOVELING SNOW FOR ONE MONTH.    SHOWERS MAY BEGIN THE DAY AFTER SURGERY.  WE SUGGEST THAT YOU REFRAIN FROM SWIMMING AND BATH TUBS FOR ONE MONTH.    MOST WOMEN CAN DRIVE AFTER 2 DAYS.  DO NOT DRIVE UNTIL YOU FEEL 100% ABLE TO RESPOND TO AN EMERGENCY ON THE ROAD.  NEVER DRIVE WHILE TAKING NARCOTIC MEDICINE.    AVOID ANYTHING IN YOUR VAGINA FR THE ENTIRE 4 WEEKS.  THIS INCLUDES TAMPONS AND INTERCOURSE.  IF YOU HAVE BEEN PRESCRIBED VAGINAL ESTROGEN CREAM, YOU MAY APPLY THIS TO THE EXTERNAL AREA WITH A FINGER.  DO NOT INSERT THE APPLICATOR INTO YOUR VAGINA.    WEEK 4 AFTER SURGERY:  FIRST FOLLOW-UP APPOINTMENT WITH THE NURSE PRACTIONER.    YOU MAY RESUME FULL EXERCISE, SWIM, TAKE TUB BATHS AND HAVE INTERCOURSE.    IF YOU HAVE A JOB THAT IS PHYSICALLY DEMANDING, YOU MAY GO BACK TO WORK IN ABOUT 4 WEEKS.    4 MONTHS AFTER SURGERY:  SECOND FOLLOW-UP APPOINTMENT WITH DR. MOREL.    FOLLOW UP APPOINTMENT:  YOU SHOULD BE SCHEDULED FOR YOUR FIRST POST-OP APPOINTMENT ABOUT 4 WEEKS AFTER YOUR SURGERY.  IF YOU DO NOT HAVE THIS ON YOUR CALENDAR, PLEASE CONTACT OUR OFFICE.  PLEASE ARRIVE WITH A PARTIALLY FULL BLADDER AS YOU WILL BE ASKED TO LEAVE A URINE SAMPLE.  DURING THE VISIT, YOU WILL BE ASKED HOW THINGS ARE GOING.  A GENTLE PELVIC EXAM WILL BE PERFORMED.  YOU WILL NOT HAVE ANY TESTING DONE INSIDE YOUR BLADDER AT THIS APPOINTMENT.     GENERAL REMINDER:  PLEASE KEEP IN MIND THAT THE OVERRIDING PRINCIPLE DURING THE FOUR-WEEK RECOVERY PROCESS IS TO ALLOW YOUR BODY TO HEAL AS WELL AS POSSIBLE TO ENSURE THE VERY BEST POSSIBLE LONG-TERM RESULT.   "TO ACHIEVE THIS SUCCESS OUR EXPERIENCE HAS SHOWN THAT PATIENTS DO BEST IF THEY CHALLENGE THEMSELVES TO DO THE LEAST POSSIBLE PHYSICAL ACTIVITY, NOT THE MOST.  MANY PATIENTS HAVE SHARED THAT THEY ACTUALLY TREASURED THIS TIME TO GET CAUGHT UP ON MANY LESS PHYSICAL ACTIVITIES SUCH AS READING, WRITING LETTERS, CRAFTS AND SPENDING QUALITY TIME WITH THEIR FAMILIES AND FRIENDS.    WHAT IF I HAVE QUESTIONS?  PLEASE CONTACT OUR OFFICE DIRECTLY IF YOU HAVE ANY QUESTIONS -[phone].       Pending Results     No orders found from 6/17/2017 to 6/20/2017.            Admission Information     Date & Time Provider Department Dept. Phone    6/19/2017 Raad Rhoades MD [facility] PreOP/PostOP [phone]      Your Vitals Were     Blood Pressure Temperature Respirations Height Weight Last Period    112/72 97.9  F (36.6  C) (Temporal) 10 1.657 m (5' 5.25\") 77.9 kg (171 lb 12.8 oz) 06/03/2017    Pulse Oximetry BMI (Body Mass Index)                99% 28.37 kg/m2          [employer] Information     [employer] gives you secure access to your electronic health record. If you see a primary care provider, you can also send messages to your care team and make appointments. If you have questions, please call your primary care clinic.  If you do not have a primary care provider, please call [phone] and they will assist you.        Care EveryWhere ID     This is your Care EveryWhere ID. This could be used by other organizations to access your [facility] medical records  [id number]           Review of your medicines      UNREVIEWED medicines. Ask your doctor about these medicines        Dose / Directions    BIOTIN PO   Used for:  Bariatric surgery status, Obesity (BMI 30.0-34.9)        Dose:  32272 mcg   Take 10,000 mcg by mouth daily   Refills:  0       CALCIUM CITRATE + D PO        Dose:  500 mg   Take 500 mg by mouth 2 times daily   Refills:  0       CYANOCOBALAMIN SL   Used for:  Bariatric surgery status, Obesity (BMI 30-39.9) "        Dose:  1000 mcg   Place 1,000 mcg under the tongue every morning   Refills:  0       docusate sodium 100 MG tablet   Commonly known as:  COLACE   Used for:  Iron deficiency, Status post bariatric surgery, Malnutrition following gastrointestinal surgery        Dose:  100 mg   Take 100 mg by mouth daily   Quantity:  60 tablet   Refills:  1       fluticasone 50 MCG/ACT spray   Commonly known as:  FLONASE        Dose:  1-2 spray   Spray 1-2 sprays into both nostrils daily as needed for rhinitis or allergies   Refills:  0       multivitamin  peds with iron 60 MG chewable tablet   Used for:  Obesity, Class III, BMI 40-49.9 (morbid obesity) (H)        Dose:  2 chew tab   Take 2 chew tab by mouth every morning   Refills:  0       VITAMIN D (CHOLECALCIFEROL) PO   Used for:  Obesity, Class III, BMI 40-49.9 (morbid obesity) (H)        Dose:  2000 Units   Take 2,000 Units by mouth daily   Refills:  0       VITRON-C PO        Dose:  2 tablet   Take 2 tablets by mouth every morning   Refills:  0         START taking        Dose / Directions    HYDROcodone-acetaminophen 5-325 MG per tablet   Commonly known as:  NORCO   Used for:  Female stress incontinence        Dose:  1-2 tablet   Take 1-2 tablets by mouth every 4 hours as needed for moderate to severe pain (Moderate to Severe Pain)   Quantity:  10 tablet   Refills:  0            Where to get your medicines      Some of these will need a paper prescription and others can be bought over the counter. Ask your nurse if you have questions.     Bring a paper prescription for each of these medications     HYDROcodone-acetaminophen 5-325 MG per tablet                Protect others around you: Learn how to safely use, store and throw away your medicines at www.disposemymeds.org.             Medication List: This is a list of all your medications and when to take them. Check marks below indicate your daily home schedule. Keep this list as a reference.      Medications            Morning Afternoon Evening Bedtime As Needed    BIOTIN PO   Take 10,000 mcg by mouth daily                                CALCIUM CITRATE + D PO   Take 500 mg by mouth 2 times daily                                CYANOCOBALAMIN SL   Place 1,000 mcg under the tongue every morning                                docusate sodium 100 MG tablet   Commonly known as:  COLACE   Take 100 mg by mouth daily                                fluticasone 50 MCG/ACT spray   Commonly known as:  FLONASE   Spray 1-2 sprays into both nostrils daily as needed for rhinitis or allergies                                HYDROcodone-acetaminophen 5-325 MG per tablet   Commonly known as:  NORCO   Take 1-2 tablets by mouth every 4 hours as needed for moderate to severe pain (Moderate to Severe Pain)                                multivitamin  peds with iron 60 MG chewable tablet   Take 2 chew tab by mouth every morning                                VITAMIN D (CHOLECALCIFEROL) PO   Take 2,000 Units by mouth daily                                VITRON-C PO   Take 2 tablets by mouth every morning

## 2017-06-19 NOTE — ANESTHESIA POSTPROCEDURE EVALUATION
Patient: Nicole Tinajero    Procedure(s):  pubovaginal sling (altis) - Wound Class: II-Clean Contaminated    Diagnosis:urinary incontinence  Diagnosis Additional Information: Urinary stress incontinence    Anesthesia Type:  General, LMA    Note:  Anesthesia Post Evaluation    Patient location during evaluation: PACU  Patient participation: Able to fully participate in evaluation  Level of consciousness: awake  Pain management: adequate  Airway patency: patent  Cardiovascular status: acceptable  Respiratory status: acceptable  Hydration status: acceptable  PONV: controlled     Anesthetic complications: None          Last vitals:  Vitals:    06/19/17 1135 06/19/17 1146 06/19/17 1224   BP:  112/72 132/65   Resp: 10 10 16   Temp:  97.9  F (36.6  C) 98.2  F (36.8  C)   SpO2: 98% 99%          Electronically Signed By: Mohamud Nix DO  June 19, 2017  12:38 PM

## 2017-06-20 NOTE — OP NOTE
DATE OF PROCEDURE:  06/19/2017      PROCEDURE  PERFORMED:  Pubovaginal sling  (Altis).      PREOPERATIVE DIAGNOSIS:  Stress urinary incontinence with hypermobility.      POSTOPERATIVE DIAGNOSIS:  Stress urinary incontinence with hypermobility.      ANESTHESIA:  General.      SURGEON:  Raad Rhoades MD      BLOOD LOSS:  5 mL.      DRAINS:  None.      COMPLICATIONS:  None.      INDICATIONS FOR SURGERY:  Nicole Tinaejro is a 52-year-old white female who has undergone outpatient evaluation for progressive stress urinary incontinence.  Pertinent findings on evaluation include moderate hypermobility of the bladder neck and urethra with grade 1  anterior compartment defect.  Cystoscopic findings include normal bladder mucosa, on the order of 400 mL with mild hypermobility and moderate demonstrable leakage on Valsalva maneuver.  In light of these findings patient was offered pubovaginal sling using the Altis technique.  Informed consent was carefully obtained and documented in the outpatient record.      SURGERY IN DETAIL:  The patient was brought to the operating room and carefully positioned supine.  General endotracheal anesthesia was administered without incident.  Prophylactic antibiotics were administered.  Pneumatic stockings were placed.  She was then carefully positioned in low lithotomy, and a sterile prep and drape was performed.  Surgical timeout was performed per protocol.       The vagina was prepared for surgery with a 20 Amharic Rizzo catheter and Peterman retractor.  Laxity of support at the bladder neck and urethra were again appreciated.  A marking pen was used to delineate the line of the proposed incision over the mid urethra.  The subepithelial tissue was infiltrated with sterile saline.  A #15-blade was used to make a longitudinal incision.  A combination of sharp and blunt dissection was carried back to the ventral portion of the anterior ramus.  This was directed at approximately 2 and 10  o'clock.       At this point, the Altis sling was carefully loaded onto the right side passing instrument.  The static anchor was then passed into the right obturator membrane at approximately the 10 o'clock position in an external rotational manner.  The introducer was carefully withdrawn in a reverse helical fashion.  Attention was performed to ensure adequate anchoring.  The left side instrument was then carefully secured to the Dynamic anchor, which was carefully freed from the suture with gentle motion.  This anchor was then carefully secured into the patient's left obturator membrane at approximately 2 o'clock, again using external rotation.  At this point, tensioning was performed to ensure that the Altis sling lay appropriately on the urethra with appropriate tension.  Once this was ascertained, the redundant portion of the tensioning suture was transected and removed.  Copious antibiotic irrigation was used.  Meticulous hemostasis was documented.  Closure was performed with a running, locked stitch of 2-0 Vicryl.  A vaginal packing soaked in antibiotic solution was temporarily placed.  The Rizzo catheter and retractor were withdrawn.       Cystoscopy was performed under real time video control with the 70-degree optic.  There was no evidence of mesh or suture in the lower urinary tract.  The bladder was drained and the scope was withdrawn.  The patient tolerated the procedure in a satisfactory manner and was brought to the recovery in stable condition.  There were no operative complications.         SOHA MOREL MD             D: 2017 07:28   T: 2017 12:26   MT: ARMANDO#[id number]      Name:     MORAIMA CARRERO   MRN:      [id number]        Account:        [id number]   :      1965           Procedure Date: 2017      Document: [id number]

## 2017-06-26 ENCOUNTER — OFFICE VISIT (OUTPATIENT)
Dept: INTERNAL MEDICINE | Facility: CLINIC | Age: 52
End: 2017-06-26

## 2017-06-26 VITALS
HEART RATE: 96 BPM | OXYGEN SATURATION: 98 % | WEIGHT: 169.6 LBS | SYSTOLIC BLOOD PRESSURE: 124 MMHG | TEMPERATURE: 98.3 F | BODY MASS INDEX: 28.01 KG/M2 | DIASTOLIC BLOOD PRESSURE: 68 MMHG

## 2017-06-26 DIAGNOSIS — Z53.9 ERRONEOUS ENCOUNTER--DISREGARD: Primary | ICD-10-CM

## 2017-06-26 NOTE — NURSING NOTE
"Chief Complaint   Patient presents with     Forms       Initial /68  Pulse 96  Temp 98.3  F (36.8  C) (Oral)  Wt 169 lb 9.6 oz (76.9 kg)  LMP 06/03/2017  SpO2 98%  BMI 28.01 kg/m2 Estimated body mass index is 28.01 kg/(m^2) as calculated from the following:    Height as of 6/19/17: 5' 5.25\" (1.657 m).    Weight as of this encounter: 169 lb 9.6 oz (76.9 kg).  Medication Reconciliation: complete     Barbra Bullard CMA      "

## 2017-06-26 NOTE — PROGRESS NOTES
SUBJECTIVE:                                                    Nicole Tinajero is a 52 year old female who presents to clinic today for the following health issues:    Forms            Problem list and histories reviewed & adjusted, as indicated.      Reviewed and updated as needed this visit by clinical staff  Tobacco  Allergies  Med Hx  Surg Hx  Fam Hx  Soc Hx      Reviewed and updated as needed this visit by Provider         ROS:  C: NEGATIVE for fever, chills, change in weight  E/M: NEGATIVE for ear, mouth and throat problems  R: NEGATIVE for significant cough or SOB  CV: NEGATIVE for chest pain, palpitations or peripheral edema    OBJECTIVE:     /68  Pulse 96  Temp 98.3  F (36.8  C) (Oral)  Wt 169 lb 9.6 oz (76.9 kg)  LMP 06/03/2017  SpO2 98%  BMI 28.01 kg/m2  Body mass index is 28.01 kg/(m^2).  GENERAL: healthy, alert and no distress  NECK: no adenopathy, no asymmetry, masses, or scars and thyroid normal to palpation  RESP: lungs clear to auscultation - no rales, rhonchi or wheezes  CV: regular rate and rhythm, normal S1 S2, no S3 or S4, no murmur, click or rub, no peripheral edema and peripheral pulses strong  ABDOMEN: soft, nontender, no hepatosplenomegaly, no masses and bowel sounds normal  MS: no gross musculoskeletal defects noted, no edema    ASSESSMENT/PLAN:     ***    June Narvaez MD  [facility]

## 2017-06-26 NOTE — MR AVS SNAPSHOT
After Visit Summary   6/26/2017    Nicole Tinajero    MRN: [id number]           Patient Information     Date Of Birth          1965        Visit Information        Provider Department      6/26/2017 3:40 PM June Narvaez MD [facility]        Today's Diagnoses     ERRONEOUS ENCOUNTER--DISREGARD    -  1       Follow-ups after your visit        Your next 10 appointments already scheduled     Jul 13, 2017 11:15 AM CDT   Return Visit with Raad Rhoades MD   [facility] ([facility]    [address] [place] [address]  [place] [address]   [phone]            Aug 18, 2017 10:00 AM CDT   Annual Visit with Barbra Mclain PA-C   [facility] ([facility])    [address] [place], [place] [id number] [place] [phone]            Aug 18, 2017 10:30 AM CDT   Annual Visit with Logan Ross 2, RD   [facility] ([facility])    [address] [place] [phone]              Who to contact     If you have questions or need follow up information about today's clinic visit or your schedule please contact [facility] directly at [phone].  Normal or non-critical lab and imaging results will be communicated to you by [employer], letter or phone within 4 business days after the clinic has received the results. If you do not hear from us within 7 days, please contact the clinic through [employer] or phone. If you have a critical or abnormal lab result, we will notify you by phone as soon as possible.  Submit refill requests through [employer] or call your pharmacy and they will forward the refill request to us. Please allow 3 business days for your refill to be completed.          Additional Information About Your Visit        [employer] Information     [employer] gives you secure access to  your electronic health record. If you see a primary care provider, you can also send messages to your care team and make appointments. If you have questions, please call your primary care clinic.  If you do not have a primary care provider, please call [phone] and they will assist you.        Care EveryWhere ID     This is your Care EveryWhere ID. This could be used by other organizations to access your [place] medical records  [id number]        Your Vitals Were     Pulse Temperature Last Period Pulse Oximetry BMI (Body Mass Index)       96 98.3  F (36.8  C) (Oral) 06/03/2017 98% 28.01 kg/m2        Blood Pressure from Last 3 Encounters:   06/26/17 124/68   06/19/17 132/65   06/13/17 106/72    Weight from Last 3 Encounters:   06/26/17 169 lb 9.6 oz (76.9 kg)   06/19/17 171 lb 12.8 oz (77.9 kg)   06/13/17 172 lb (78 kg)              Today, you had the following     No orders found for display       Primary Care Provider Office Phone # Fax #    June Narvaez [phone]       [facility] [address] [facility] [address]        Equal Access to Services     HANNAH KOEHLER : Hadii aad ku hadasho Soomaali, waaxda luqadaha, qaybta kaalmada adeegyada, josué álvarezin haymashan sheri gomez. So [facility] [phone].    ATENCIÓN: Si habla español, tiene a amezquita disposición servicios gratuitos de asistencia lingüística. Llame al [phone].    We comply with applicable federal civil rights laws and [place] laws. We do not discriminate on the basis of race, color, national origin, age, disability sex, sexual orientation or gender identity.            Thank you!     Thank you for choosing [facility]  for your care. Our goal is always to provide you with excellent care. Hearing back from our patients is one way we can continue to improve our services. Please take a few minutes to complete the written survey that you may receive in the mail after your visit with us.  Thank you!             Your Updated Medication List - Protect others around you: Learn how to safely use, store and throw away your medicines at www.disposemymeds.org.          This list is accurate as of: 6/26/17  8:39 PM.  Always use your most recent med list.                   Brand Name Dispense Instructions for use Diagnosis    BIOTIN PO      Take 10,000 mcg by mouth daily    Bariatric surgery status, Obesity (BMI 30.0-34.9)       CALCIUM CITRATE + D PO      Take 500 mg by mouth 2 times daily        CYANOCOBALAMIN SL      Place 1,000 mcg under the tongue every morning    Bariatric surgery status, Obesity (BMI 30-39.9)       docusate sodium 100 MG tablet    COLACE    60 tablet    Take 100 mg by mouth daily    Iron deficiency, Status post bariatric surgery, Malnutrition following gastrointestinal surgery       fluticasone 50 MCG/ACT spray    FLONASE     Spray 1-2 sprays into both nostrils daily as needed for rhinitis or allergies        HYDROcodone-acetaminophen 5-325 MG per tablet    NORCO    10 tablet    Take 1-2 tablets by mouth every 4 hours as needed for moderate to severe pain (Moderate to Severe Pain)    Female stress incontinence       multivitamin  peds with iron 60 MG chewable tablet      Take 2 chew tab by mouth every morning    Obesity, Class III, BMI 40-49.9 (morbid obesity) (H)       VITAMIN D (CHOLECALCIFEROL) PO      Take 2,000 Units by mouth daily    Obesity, Class III, BMI 40-49.9 (morbid obesity) (H)       VITRON-C PO      Take 2 tablets by mouth every morning

## 2017-07-06 ENCOUNTER — TELEPHONE (OUTPATIENT)
Dept: INTERNAL MEDICINE | Facility: CLINIC | Age: 52
End: 2017-07-06

## 2017-07-13 ENCOUNTER — OFFICE VISIT (OUTPATIENT)
Dept: UROLOGY | Facility: CLINIC | Age: 52
End: 2017-07-13
Payer: COMMERCIAL

## 2017-07-13 VITALS — DIASTOLIC BLOOD PRESSURE: 80 MMHG | RESPIRATION RATE: 12 BRPM | SYSTOLIC BLOOD PRESSURE: 100 MMHG | HEART RATE: 68 BPM

## 2017-07-13 DIAGNOSIS — N39.3 FEMALE STRESS INCONTINENCE: Primary | ICD-10-CM

## 2017-07-13 LAB
ALBUMIN UR-MCNC: NEGATIVE MG/DL
APPEARANCE UR: CLEAR
BILIRUB UR QL STRIP: NEGATIVE
COLOR UR AUTO: YELLOW
GLUCOSE UR STRIP-MCNC: NEGATIVE MG/DL
HGB UR QL STRIP: NEGATIVE
KETONES UR STRIP-MCNC: ABNORMAL MG/DL
LEUKOCYTE ESTERASE UR QL STRIP: NEGATIVE
NITRATE UR QL: NEGATIVE
PH UR STRIP: 5.5 PH (ref 5–7)
SP GR UR STRIP: 1.02 (ref 1–1.03)
URN SPEC COLLECT METH UR: ABNORMAL
UROBILINOGEN UR STRIP-ACNC: 0.2 EU/DL (ref 0.2–1)

## 2017-07-13 PROCEDURE — 81003 URINALYSIS AUTO W/O SCOPE: CPT | Performed by: UROLOGY

## 2017-07-13 PROCEDURE — 99024 POSTOP FOLLOW-UP VISIT: CPT | Performed by: UROLOGY

## 2017-07-13 NOTE — PROGRESS NOTES
S/p Altis sling 6/19/17.    Now completely free of incont, even with exercise. Some vag secretions and sweat with her vigorous workouts.    Denies dysuria, gross hematuria, frequency, nocturia.    PE: Excellent support, nontender    Results for orders placed or performed in visit on 07/13/17   UA reflex to Microscopic and Culture [[id number]]   Result Value Ref Range    Color Urine Yellow     Appearance Urine Clear     Glucose Urine Negative NEG mg/dL    Bilirubin Urine Negative NEG    Ketones Urine Trace (A) NEG mg/dL    Specific Gravity Urine 1.025 1.003 - 1.035    Blood Urine Negative NEG    pH Urine 5.5 5.0 - 7.0 pH    Protein Albumin Urine Negative NEG mg/dL    Urobilinogen Urine 0.2 0.2 - 1.0 EU/dL    Nitrite Urine Negative NEG    Leukocyte Esterase Urine Negative NEG    Source Midstream Urine      IMP:  1. Resolution of CARLOS with sling      PLAN:  1. Discussed situation with patient in detail.  2. Resume all normal activities at 1 month point  3. RTC only pRn  4. Copy [employer]

## 2017-07-13 NOTE — MR AVS SNAPSHOT
After Visit Summary   7/13/2017    Nicole Tinajero    MRN: [id number]           Patient Information     Date Of Birth          1965        Visit Information        Provider Department      7/13/2017 11:15 AM Raad Rhoades MD [facility]        Today's Diagnoses     Female stress incontinence    -  1       Follow-ups after your visit        Follow-up notes from your care team     Return if symptoms worsen or fail to improve.      Your next 10 appointments already scheduled     Aug 18, 2017 10:00 AM CDT   Annual Visit with Babrra Mclain PA-C   [facility] ([facility])    [address] [address] [phone]            Aug 18, 2017 10:30 AM CDT   Annual Visit with Logan Ross 2, RD   [facility] ([facility])    [address] [address] [phone]              Who to contact     If you have questions or need follow up information about today's clinic visit or your schedule please contact [facility] directly at [phone].  Normal or non-critical lab and imaging results will be communicated to you by [employer], letter or phone within 4 business days after the clinic has received the results. If you do not hear from us within 7 days, please contact the clinic through [employer] or phone. If you have a critical or abnormal lab result, we will notify you by phone as soon as possible.  Submit refill requests through [employer] or call your pharmacy and they will forward the refill request to us. Please allow 3 business days for your refill to be completed.          Additional Information About Your Visit        [employer] Information     [employer] gives you secure access to your electronic health record. If you see a primary care provider, you can also send messages to your care team  and make appointments. If you have questions, please call your primary care clinic.  If you do not have a primary care provider, please call [phone] and they will assist you.        Care EveryWhere ID     This is your Care EveryWhere ID. This could be used by other organizations to access your [place] medical records  [id number]        Your Vitals Were     Pulse Respirations                68 12           Blood Pressure from Last 3 Encounters:   07/13/17 100/80   06/26/17 124/68   06/19/17 132/65    Weight from Last 3 Encounters:   06/26/17 169 lb 9.6 oz (76.9 kg)   06/19/17 171 lb 12.8 oz (77.9 kg)   06/13/17 172 lb (78 kg)              We Performed the Following     UA reflex to Microscopic and Culture [[id number]]          Today's Medication Changes          These changes are accurate as of: 7/13/17 11:36 AM.  If you have any questions, ask your nurse or doctor.               Stop taking these medicines if you haven't already. Please contact your care team if you have questions.     HYDROcodone-acetaminophen 5-325 MG per tablet   Commonly known as:  NORCO   Stopped by:  Raad Rhoades MD                    Primary Care Provider Office Phone # Fax #    June Chemo Narvaez [phone]       [facility] [address] [place] [address]        Equal Access to Services     HANNAH KOEHLER AH: Hadii aad ku hadasho Soomaali, waaxda luqadaha, qaybta kaalmada adeegyada, josué gomez. So [facility] [phone].    ATENCIÓN: Si habla español, tiene a amezquita disposición servicios gratuitos de asistencia lingüística. Connie al [phone].    We comply with applicable federal civil rights laws and [place] laws. We do not discriminate on the basis of race, color, national origin, age, disability sex, sexual orientation or gender identity.            Thank you!     Thank you for choosing [facility]  for your care. Our goal is always to provide you  with excellent care. Hearing back from our patients is one way we can continue to improve our services. Please take a few minutes to complete the written survey that you may receive in the mail after your visit with us. Thank you!             Your Updated Medication List - Protect others around you: Learn how to safely use, store and throw away your medicines at www.disposemymeds.org.          This list is accurate as of: 7/13/17 11:36 AM.  Always use your most recent med list.                   Brand Name Dispense Instructions for use Diagnosis    BIOTIN PO      Take 10,000 mcg by mouth daily    Bariatric surgery status, Obesity (BMI 30.0-34.9)       CALCIUM CITRATE + D PO      Take 500 mg by mouth 2 times daily        CYANOCOBALAMIN SL      Place 1,000 mcg under the tongue every morning    Bariatric surgery status, Obesity (BMI 30-39.9)       docusate sodium 100 MG tablet    COLACE    60 tablet    Take 100 mg by mouth daily    Iron deficiency, Status post bariatric surgery, Malnutrition following gastrointestinal surgery       fluticasone 50 MCG/ACT spray    FLONASE     Spray 1-2 sprays into both nostrils daily as needed for rhinitis or allergies        multivitamin  peds with iron 60 MG chewable tablet      Take 2 chew tab by mouth every morning    Obesity, Class III, BMI 40-49.9 (morbid obesity) (H)       VITAMIN D (CHOLECALCIFEROL) PO      Take 2,000 Units by mouth daily    Obesity, Class III, BMI 40-49.9 (morbid obesity) (H)       VITRON-C PO      Take 2 tablets by mouth every morning

## 2017-07-13 NOTE — NURSING NOTE
"Chief Complaint   Patient presents with     Surgical Followup       Initial /80 (BP Location: Right arm, Patient Position: Chair, Cuff Size: Adult Large)  Pulse 68  Resp 12 Estimated body mass index is 28.01 kg/(m^2) as calculated from the following:    Height as of 6/19/17: 1.657 m (5' 5.25\").    Weight as of 6/26/17: 76.9 kg (169 lb 9.6 oz).  Medication Reconciliation: complete   Ruth Amezcua, SAMIA      "

## 2017-08-14 DIAGNOSIS — K91.2 POSTSURGICAL MALABSORPTION: ICD-10-CM

## 2017-08-14 DIAGNOSIS — E61.1 IRON DEFICIENCY: ICD-10-CM

## 2017-08-14 DIAGNOSIS — Z98.84 BARIATRIC SURGERY STATUS: ICD-10-CM

## 2017-08-14 LAB
ERYTHROCYTE [DISTWIDTH] IN BLOOD BY AUTOMATED COUNT: 13.7 % (ref 10–15)
FERRITIN SERPL-MCNC: 11 NG/ML (ref 8–252)
HCT VFR BLD AUTO: 39.1 % (ref 35–47)
HGB BLD-MCNC: 12.6 G/DL (ref 11.7–15.7)
IRON SATN MFR SERPL: 33 % (ref 15–46)
IRON SERPL-MCNC: 118 UG/DL (ref 35–180)
MCH RBC QN AUTO: 30.7 PG (ref 26.5–33)
MCHC RBC AUTO-ENTMCNC: 32.2 G/DL (ref 31.5–36.5)
MCV RBC AUTO: 95 FL (ref 78–100)
PLATELET # BLD AUTO: 178 10E9/L (ref 150–450)
PTH-INTACT SERPL-MCNC: 56 PG/ML (ref 12–72)
RBC # BLD AUTO: 4.1 10E12/L (ref 3.8–5.2)
TIBC SERPL-MCNC: 355 UG/DL (ref 240–430)
VIT B12 SERPL-MCNC: 1584 PG/ML (ref 193–986)
WBC # BLD AUTO: 6.7 10E9/L (ref 4–11)

## 2017-08-14 PROCEDURE — 83970 ASSAY OF PARATHORMONE: CPT | Performed by: PHYSICIAN ASSISTANT

## 2017-08-14 PROCEDURE — 82728 ASSAY OF FERRITIN: CPT | Performed by: PHYSICIAN ASSISTANT

## 2017-08-14 PROCEDURE — 82306 VITAMIN D 25 HYDROXY: CPT | Performed by: PHYSICIAN ASSISTANT

## 2017-08-14 PROCEDURE — 83540 ASSAY OF IRON: CPT | Performed by: PHYSICIAN ASSISTANT

## 2017-08-14 PROCEDURE — 85027 COMPLETE CBC AUTOMATED: CPT | Performed by: PHYSICIAN ASSISTANT

## 2017-08-14 PROCEDURE — 83550 IRON BINDING TEST: CPT | Performed by: PHYSICIAN ASSISTANT

## 2017-08-14 PROCEDURE — 36415 COLL VENOUS BLD VENIPUNCTURE: CPT | Performed by: PHYSICIAN ASSISTANT

## 2017-08-14 PROCEDURE — 82607 VITAMIN B-12: CPT | Performed by: PHYSICIAN ASSISTANT

## 2017-08-15 LAB — DEPRECATED CALCIDIOL+CALCIFEROL SERPL-MC: 49 UG/L (ref 20–75)

## 2017-08-18 ENCOUNTER — OFFICE VISIT (OUTPATIENT)
Dept: SURGERY | Facility: CLINIC | Age: 52
End: 2017-08-18
Payer: COMMERCIAL

## 2017-08-18 VITALS
WEIGHT: 167.7 LBS | SYSTOLIC BLOOD PRESSURE: 113 MMHG | HEIGHT: 65 IN | DIASTOLIC BLOOD PRESSURE: 76 MMHG | RESPIRATION RATE: 14 BRPM | BODY MASS INDEX: 27.94 KG/M2 | HEART RATE: 58 BPM

## 2017-08-18 DIAGNOSIS — Z98.84 BARIATRIC SURGERY STATUS: ICD-10-CM

## 2017-08-18 DIAGNOSIS — Z98.84 STATUS POST BARIATRIC SURGERY: ICD-10-CM

## 2017-08-18 PROCEDURE — 99213 OFFICE O/P EST LOW 20 MIN: CPT | Performed by: PHYSICIAN ASSISTANT

## 2017-08-18 PROCEDURE — 97803 MED NUTRITION INDIV SUBSEQ: CPT | Performed by: DIETITIAN, REGISTERED

## 2017-08-18 NOTE — PROGRESS NOTES
DATE OF VISIT: 2017  Name: MORAIMA CARRERO  : 1965  Gender: Female  MRN: [id number]  Age: 52    ASSESSMENT:  REASON FOR VISIT:  MORAIMA CARRERO is a 52 year old Female presents today for 1 year PO nutrition follow-up appointment. She was accompanied by her spouse, Mohamud.  DIAGNOSIS:  Status post Laparoscopic Isabel-en-Y Gastric Bypass surgery.  ANTHROPOMETRICS:  Height: 66 inches  Weight: 167 lbs  BMI: 26.9 kg/m2  VITAMINS AND MINERALS:  2 multivitamins/ minerals  500 mg calcium with vitamin D -TID and 2 hours way from multivitamin/ mineral and iron  2000 International Units Vitamin B  1000 mcg vitamin B-12, SL  2 Vitron C  NUTRITION HISTORY:  Breakfast: frittata (egg with turkey pacheco) + yogurt within 1 hour if at home or when traveling-scrambled egg, turkey sausage or ham, small amount of fruit (9:00)   Lunch: ~3 oz chicken or pork tenderloin, sugar free canned fruit or leftover green beans leftovers; chicken breast + fruit cup (12:30)  Supper: ~ 3-4 oz fish or chicken or steak, vegetable, small amount of mashed or baked potato (6:00-7:00)  Snacks: pretzels or few tortilla chips-afternoon and after dinner   Beverages:Crystal light Protein drink Gatorade Zero  Consuming liquid calories: none  Protein intake: 60-90 grams/day  Tolerate regular texture food: Yes  Any foods not tolerated details: none-tolerates all food and does not get dumping from eating sugar  Portion size: 1 cup or more  Take 30 minutes to consume each meal: Sometimes  Eat protein foods first: Yes  Fluids and meals separate by at least 30 minutes: Yes  Chew foods 20 plus times: Yes  Tolerating diet: bariatric regular diet  Drinking high protein supplements/shakes: Yes  Consuming meals per day: 3  Consuming snacks per day: 1  Comment: Pt shares her adult daughter is also starting program today; pt's cousin is 4 years out from bariatric surgery and will be a good resource for pt; works from home, travels to [place] for work several times per year;  " is very supportive and does a lot of the cooking; mindless eating in evenings is improving; dine out/fast food often.  8/18/17 Patient having hunger between meals and has continued to snack between meals. Patient upset with herself that she did not lose 100 lbs. in 1 year. Patient does recognize that she lost weight and is happy with her current weight but would like to lose more weight. Patient exercising consistently 5-6 days per week for 60 minutes. Patient continues to travel for work 3 months out of the year and plans exercise and eating accordingly for when she travels. Patient has reduced dining out to prevent eating too much or unhealthy foods.     PHYSICAL ACTIVITY:  Type: Cardio and weights  Frequency: 5-6 times a week  Duration (min): 60  DIAGNOSIS:  Previous Nutrition Diagnosis: Altered gastrointestinal function related to alteration in gastrointestinal structure as evidenced by history of Laparoscopic Isabel-en-Y Gastric Bypass surgery.-no change  Previous goals:  Focus on getting in 10-15 g fiber per day (pt wants to go back to tracking on ambika)-not met  Consistently take 20-30 minutes per meal-met  Try having \"solid food\" at breakfast-met  Use one protein drink between meals instead of snacking-met but still snacking even with protein shake  Current Nutrition Diagnosis: Altered gastrointestinal function related to alteration in gastrointestinal structure as evidenced by history of Laparoscopic Isabel-en-Y Gastric Bypass  INTERVENTION:  Nutrition Prescription: Eat 3 meals a day at regular intervals. Consume 60-90 grams of protein daily. Follow post-surgical vitamins and minerals protocol.  Goals:  Continue to practice all post-surgical diet guidelines  Aim for 10-15 grams fiber  Drink 2 cups milk between meals to replace snacking  Place note on pantry door as reminder to limit snacking   Implementation: Discussed progress toward previous goals; reinforced importance of following bariatric lifestyle " changes  NUTRITION MONITORING AND EVALUATION:  Anticipated compliance: Good  Verbalized understanding by stating will increase fiber     Follow up:  Patient to follow up in 1 year for 2 year post op appointment  TIME SPENT WITH PATIENT:  25 minutes  Mark Anthony Cox, RD, LD  [facility] Outpatient Dietitian  [phone] (office phone)

## 2017-08-18 NOTE — MR AVS SNAPSHOT
MRN:[id number]                      After Visit Summary   8/18/2017    Nicole Tinajero    MRN: [id number]           Visit Information        Provider Department      8/18/2017 10:00 AM 2, Sh Becki Ross RD [facility]      [facility] Information     [facility] gives you secure access to your electronic health record. If you see a primary care provider, you can also send messages to your care team and make appointments. If you have questions, please call your primary care clinic.  If you do not have a primary care provider, please call [phone] and they will assist you.        Care EveryWhere ID     This is your Care EveryWhere ID. This could be used by other organizations to access your [place] medical records  [id number]        Equal Access to Services     HANNAH KOEHLER : Dick Saha, waarabella pennington, qajustino kaalberta oreilly, josué gomez. So [facility] [phone].    ATENCIÓN: Si habla español, tiene a amezquita disposición servicios gratuitos de asistencia lingüística. Llame al [phone].    We comply with applicable federal civil rights laws and [place] laws. We do not discriminate on the basis of race, color, national origin, age, disability sex, sexual orientation or gender identity.

## 2017-08-18 NOTE — PROGRESS NOTES
FOLLOW UP BARIATRIC SURGERY    Date: 2017    RE: MORAIMA CARRERO  MR#: [id number]  : 1965    HISTORY OF PRESENT ILLNESS: MORAIMA CARRERO returns today for her follow-up appointment status post Laparoscopic Isabel-en-Y Gastric Bypass surgery. She has lost 90.0 lbs since starting our program. Patient is feeling well. She is doing the following exercise(s): Cardio and weights. She exercises 5-6 times a week for 60 minutes per session. Here today with her . She is feeling great. Happy with her weight loss. Taking all of her recommended post op vitamins including 3000 mcg sublingual vitamin B12 every 3rd day and 2 vitron C daily.     OBESITY RELATED CONDITIONS:  HTN: resolved  GERD: resolved  Joint Pain: resolved    SOCIAL HISTORY:  She does not smoke. She does not drink alcohol. No NSAID use. She attends support group and feels safe in current environment.    REVIEW OF SYSTEMS:  GI:  Nausea-never  Vomiting-never  Diarrhea-never  Constipation-never  Dysphagia-never  Abdominal Pains-never  Heartburn-never  Skin:  Intertriginous Irritation-never    Psychiatric:  Depression never    PHYSICAL EXAMINATION:   Vital Signs: Weight: 167 lbs; BMI: 27.7; BP: 113 / 76; Pulse: 58; Resp: 14;  GENERAL: Alert and oriented x3. NAD  HEART: Regular rate and rhythm, No murmurs, rubs or gallops  LUNGS: Breathing unlabored, Lung sounds clear to auscultation bilaterally  ABDOMEN: soft; nontender; nondistended, BS present, Incision/incisions clean/dry/intact  EXTREMITIES: No edema  SKIN: dry, warm to touch    ASSESSMENT:   12 months s/p Laparoscopic Isabel-en-Y Gastric Bypass  Post surgical malabsorption  Hypertension - resolved  obesity - resolved    PLAN:   Reviewed CBC, vitamin B12, vitamin D, PTH, ferritin, TIBC, and iron labs. Discussed decreasing her 3000 mcg of vitamin B12 down to twice weekly. Continue her 2 vitron C daily with her colace as long as she continues to menstrate.   Follow food plan per dietitian  recommendations.  Continue taking other recommended post-op vitamins.  Continue her exercise routine. Patient was asking about body contouring. Information on plastic surgery was provided.   Patient wants to have some alcohol. Discussed alcohol use post bariatric surgery and the greater than 10% risk of alcohol use disorder.   Return to clinic in 1 year.    This was a 15 minute visit with greater than 50% spent on counseling.

## 2017-08-18 NOTE — MR AVS SNAPSHOT
After Visit Summary   8/18/2017    Nicole Tinajero    MRN: [id number]           Patient Information     Date Of Birth          1965        Visit Information        Provider Department      8/18/2017 10:30 AM Ulises Sahu PA-C [facility]      Today's Diagnoses     BMI 27.0-27.9,adult    -  1    Status post bariatric surgery          Care Instructions    PLAN:   Reviewed CBC, vitamin B12, vitamin D, PTH, ferritin, TIBC, and iron labs. Discussed decreasing her 3000 mcg of vitamin B12 down to twice weekly. Continue her 2 vitron C daily with her colace as long as she continues to menstrate.   Follow food plan per dietitian recommendations.  Continue taking other recommended post-op vitamins.  Continue her exercise routine. Patient was asking about body contouring. Information on plastic surgery was provided.   Patient wants to have some alcohol. Discussed alcohol use post bariatric surgery and the greater than 10% risk of alcohol use disorder.   Return to clinic in 1 year.          Follow-ups after your visit        Your next 10 appointments already scheduled     Aug 17, 2018 10:00 AM CDT   Annual Visit with Ulises Sahu PA-C   [facility] ([facility])    [address] [place] [id number] [phone]            Aug 17, 2018 10:30 AM CDT   Annual Visit with Logan Ross 2, RD   [facility] ([facility])    [address] [place] [id number]   [phone]              Who to contact     If you have questions or need follow up information about today's clinic visit or your schedule please contact [facility] directly at [phone].  Normal or non-critical lab and imaging results will be communicated to you by  "MyChart, letter or phone within 4 business days after the clinic has received the results. If you do not hear from us within 7 days, please contact the clinic through [employer] or phone. If you have a critical or abnormal lab result, we will notify you by phone as soon as possible.  Submit refill requests through [employer] or call your pharmacy and they will forward the refill request to us. Please allow 3 business days for your refill to be completed.          Additional Information About Your Visit        [employer] Information     [employer] gives you secure access to your electronic health record. If you see a primary care provider, you can also send messages to your care team and make appointments. If you have questions, please call your primary care clinic.  If you do not have a primary care provider, please call [phone] and they will assist you.        Care EveryWhere ID     This is your Care EveryWhere ID. This could be used by other organizations to access your [place] medical records  [id number]        Your Vitals Were     Pulse Respirations Height BMI (Body Mass Index)          58 14 5' 5.25\" (1.657 m) 27.69 kg/m2         Blood Pressure from Last 3 Encounters:   08/18/17 113/76   07/13/17 100/80   06/26/17 124/68    Weight from Last 3 Encounters:   08/18/17 167 lb 11.2 oz (76.1 kg)   06/26/17 169 lb 9.6 oz (76.9 kg)   06/19/17 171 lb 12.8 oz (77.9 kg)              We Performed the Following     OP ROOMING NOTE TO MAGI        Primary Care Provider Office Phone # Fax #    June Narvaez [phone]       [address]  [facility] [address]        Equal Access to Services     [facility] Center[facility]AT[facility]NA : Hadii aad ronald samayoa Sojean paul, waaxda luqadaha, qaybta kaalmada aderiosyada, josué minaya . So [facility] [phone].    ATENCIÓN: Si habla español, tiene a amezquita disposición servicios gratuitos de asistencia lingüística. Llame al [phone].    We comply with applicable " federal civil rights laws and [place] laws. We do not discriminate on the basis of race, color, national origin, age, disability sex, sexual orientation or gender identity.            Thank you!     Thank you for choosing [facility]  for your care. Our goal is always to provide you with excellent care. Hearing back from our patients is one way we can continue to improve our services. Please take a few minutes to complete the written survey that you may receive in the mail after your visit with us. Thank you!             Your Updated Medication List - Protect others around you: Learn how to safely use, store and throw away your medicines at www.disposemymeds.org.          This list is accurate as of: 8/18/17 11:17 AM.  Always use your most recent med list.                   Brand Name Dispense Instructions for use Diagnosis    BIOTIN PO      Take 10,000 mcg by mouth daily    Bariatric surgery status, Obesity (BMI 30.0-34.9)       CALCIUM CITRATE + D PO      Take 500 mg by mouth 2 times daily        CYANOCOBALAMIN SL      Place 1,000 mcg under the tongue every morning    Bariatric surgery status, Obesity (BMI 30-39.9)       docusate sodium 100 MG tablet    COLACE    60 tablet    Take 100 mg by mouth daily    Iron deficiency, Status post bariatric surgery, Malnutrition following gastrointestinal surgery       fluticasone 50 MCG/ACT spray    FLONASE     Spray 1-2 sprays into both nostrils daily as needed for rhinitis or allergies        multivitamin  peds with iron 60 MG chewable tablet      Take 2 chew tab by mouth every morning    Obesity, Class III, BMI 40-49.9 (morbid obesity) (H)       VITAMIN D (CHOLECALCIFEROL) PO      Take 2,000 Units by mouth daily    Obesity, Class III, BMI 40-49.9 (morbid obesity) (H)       VITRON-C PO      Take 2 tablets by mouth every morning

## 2017-09-11 ENCOUNTER — TELEPHONE (OUTPATIENT)
Dept: INTERNAL MEDICINE | Facility: CLINIC | Age: 52
End: 2017-09-11

## 2017-09-11 NOTE — TELEPHONE ENCOUNTER
Form received from: [employer]    Form requesting following info/need: Attending physician's report    ITZEL needed?: No    Location of form: Dr. Narvaez's in basket    When completed the route for return: Fax

## 2017-12-03 ENCOUNTER — HEALTH MAINTENANCE LETTER (OUTPATIENT)
Age: 52
End: 2017-12-03

## 2018-01-26 ENCOUNTER — HOSPITAL ENCOUNTER (OUTPATIENT)
Dept: MAMMOGRAPHY | Facility: CLINIC | Age: 53
Discharge: HOME | End: 2018-01-26
Attending: INTERNAL MEDICINE | Admitting: INTERNAL MEDICINE
Payer: COMMERCIAL

## 2018-01-26 DIAGNOSIS — Z12.31 VISIT FOR SCREENING MAMMOGRAM: ICD-10-CM

## 2018-01-26 PROCEDURE — 77063 BREAST TOMOSYNTHESIS BI: CPT

## 2018-02-12 ENCOUNTER — HOSPITAL ENCOUNTER (OUTPATIENT)
Dept: MAMMOGRAPHY | Facility: CLINIC | Age: 53
Discharge: HOME | End: 2018-02-12
Attending: INTERNAL MEDICINE | Admitting: INTERNAL MEDICINE
Payer: COMMERCIAL

## 2018-02-12 DIAGNOSIS — Z12.31 VISIT FOR SCREENING MAMMOGRAM: ICD-10-CM

## 2018-02-12 DIAGNOSIS — R92.0 MICROCALCIFICATION OF RIGHT BREAST ON MAMMOGRAM: ICD-10-CM

## 2018-02-12 PROCEDURE — 77065 DX MAMMO INCL CAD UNI: CPT | Mod: RT

## 2018-03-13 ENCOUNTER — HOSPITAL ENCOUNTER (OUTPATIENT)
Dept: MAMMOGRAPHY | Facility: CLINIC | Age: 53
Discharge: HOME | End: 2018-03-13
Attending: INTERNAL MEDICINE | Admitting: INTERNAL MEDICINE
Payer: COMMERCIAL

## 2018-03-13 ENCOUNTER — HOSPITAL ENCOUNTER (OUTPATIENT)
Dept: MAMMOGRAPHY | Facility: CLINIC | Age: 53
End: 2018-03-13
Attending: INTERNAL MEDICINE
Payer: COMMERCIAL

## 2018-03-13 DIAGNOSIS — R92.0 MICROCALCIFICATION OF RIGHT BREAST ON MAMMOGRAM: ICD-10-CM

## 2018-03-13 DIAGNOSIS — Z12.31 VISIT FOR SCREENING MAMMOGRAM: ICD-10-CM

## 2018-03-13 PROCEDURE — 25000125 ZZHC RX 250: Performed by: INTERNAL MEDICINE

## 2018-03-13 PROCEDURE — 88305 TISSUE EXAM BY PATHOLOGIST: CPT | Performed by: INTERNAL MEDICINE

## 2018-03-13 PROCEDURE — 40000986 MA POST PROCEDURE RIGHT

## 2018-03-13 PROCEDURE — 88305 TISSUE EXAM BY PATHOLOGIST: CPT | Mod: 26 | Performed by: INTERNAL MEDICINE

## 2018-03-13 PROCEDURE — 19081 BX BREAST 1ST LESION STRTCTC: CPT | Mod: RT

## 2018-03-13 RX ORDER — LIDOCAINE HYDROCHLORIDE AND EPINEPHRINE 10; 10 MG/ML; UG/ML
1 INJECTION, SOLUTION INFILTRATION; PERINEURAL ONCE
Status: COMPLETED | OUTPATIENT
Start: 2018-03-13 | End: 2018-03-13

## 2018-03-13 RX ADMIN — LIDOCAINE HYDROCHLORIDE 6 ML: 10 INJECTION, SOLUTION INFILTRATION; PERINEURAL at 10:30

## 2018-03-13 RX ADMIN — LIDOCAINE HYDROCHLORIDE,EPINEPHRINE BITARTRATE 12 ML: 10; .01 INJECTION, SOLUTION INFILTRATION; PERINEURAL at 10:30

## 2018-03-13 NOTE — DISCHARGE INSTRUCTIONS
Page 1 of 1  For informational purposes only. Not to replace the advice of your health care provider. Copyright   2010 [facility]. All rights reserved. [employer] 665256 - REV 02/16.  After Your Breast Biopsy   Bleeding or bruising  Slight bruising is normal. If you bleed through the bandage, put direct pressure on the breast for 10 minutes.   If the breast begins to swell, or you have a lot of bleeding after 10 minutes of pressure, call the doctor who ordered your exam. Or, go to the emergency room.   Bandages  Keep your bandage in place until tomorrow morning. Do not get it wet.   If you have small pieces of tape on the skin, leave them in place. They will fall off on their own, or you can remove them after 5 days.   Activity  You may shower the morning after the exam. No heavy activity (lifting, vacuuming) on the day of your exam. You may go back to normal activity the next day, unless you had a lot of bleeding or pain.  Discomfort  You may take Tylenol (acetaminophen) today for pain. Tomorrow, you may take an anti-inflammatory medicine (aspirin, ibuprofen, Motrin, Aleve, Advil), unless your doctor tells you not to.  Wear your bra overnight to support the breast. You may also use an ice pack: Place it over the area for 15-20 minutes several times a day.  Infection  Infection is rare. Symptoms include fever, redness, increasing pain and fluid draining from the biopsy site. If you have any of these symptoms, please call the doctor who ordered your exam.  Results  Results may take up to 5 business days. A nurse or doctor from the Breast Center will call with your results. We will also send the results to the doctor who ordered your biopsy.  If you have not heard your results in 5 days, please call the Breast Center.   Other instructions  ______________________________________________________________________________________________________________________________  Call your doctor if:    You have  bleeding that lasts more than 10 minutes.    You have pain that cannot be controlled.   You have signs of infection (fever, redness, drainage or other signs).   You have not received your results within 5 days.    Please call the Breast Center nurse navigator at [phone] if you have questions or concerns about your biopsy.

## 2018-03-13 NOTE — LETTER
June Narvaez  [address] [facility] [address]      October 5, 2018      Exam Date: 3/13/18    Dear June Narvaez:  In accordance with Mammography Quality Standards Act of 1992 (MQSA), we are informing you that the below named patient has been mailed two separate notices for a Short Interval Follow Up 6 Months.    We feel we have given Nicole Mtzler 1965 an adequate amount of time to respond. As part of the MQSA, this will be our final attempt to contact the patient.    Please notify us, either by phone [phone] or fax [phone], if the patient has gone to another facility for her follow-up study.    Thank you for any assistance you can provide.        Sincerely,    Your Care Team    [facility]

## 2018-03-14 ENCOUNTER — TELEPHONE (OUTPATIENT)
Dept: MAMMOGRAPHY | Facility: CLINIC | Age: 53
End: 2018-03-14

## 2018-03-14 LAB — COPATH REPORT: NORMAL

## 2018-03-14 NOTE — TELEPHONE ENCOUNTER
Pathology report reviewed with breast radiologist Noe. I phoned and informed patient of results showing benign stromal fibrosis and microcalcificaitons. Recommended follow up is 6 month follow up right mammogram.  Patient states no problems with biopsy site. Questions were answered and my phone number given if she has further questions or concerns.

## 2018-03-20 ENCOUNTER — OFFICE VISIT (OUTPATIENT)
Dept: INTERNAL MEDICINE | Facility: CLINIC | Age: 53
End: 2018-03-20
Payer: COMMERCIAL

## 2018-03-20 VITALS
TEMPERATURE: 97.8 F | HEART RATE: 77 BPM | DIASTOLIC BLOOD PRESSURE: 70 MMHG | SYSTOLIC BLOOD PRESSURE: 120 MMHG | BODY MASS INDEX: 27.82 KG/M2 | WEIGHT: 167 LBS | OXYGEN SATURATION: 96 % | HEIGHT: 65 IN

## 2018-03-20 DIAGNOSIS — N92.6 IRREGULAR MENSTRUAL CYCLE: Primary | ICD-10-CM

## 2018-03-20 DIAGNOSIS — N92.0 EXCESSIVE OR FREQUENT MENSTRUATION: ICD-10-CM

## 2018-03-20 DIAGNOSIS — Z12.11 SCREEN FOR COLON CANCER: ICD-10-CM

## 2018-03-20 LAB
BASOPHILS # BLD AUTO: 0.1 10E9/L (ref 0–0.2)
BASOPHILS NFR BLD AUTO: 0.9 %
BETA HCG QUAL IFA URINE: NEGATIVE
DIFFERENTIAL METHOD BLD: NORMAL
EOSINOPHIL # BLD AUTO: 0.1 10E9/L (ref 0–0.7)
EOSINOPHIL NFR BLD AUTO: 1.9 %
ERYTHROCYTE [DISTWIDTH] IN BLOOD BY AUTOMATED COUNT: 13.4 % (ref 10–15)
HCT VFR BLD AUTO: 38.2 % (ref 35–47)
HGB BLD-MCNC: 12.2 G/DL (ref 11.7–15.7)
LYMPHOCYTES # BLD AUTO: 1.8 10E9/L (ref 0.8–5.3)
LYMPHOCYTES NFR BLD AUTO: 31.9 %
MCH RBC QN AUTO: 30.6 PG (ref 26.5–33)
MCHC RBC AUTO-ENTMCNC: 31.9 G/DL (ref 31.5–36.5)
MCV RBC AUTO: 96 FL (ref 78–100)
MONOCYTES # BLD AUTO: 0.7 10E9/L (ref 0–1.3)
MONOCYTES NFR BLD AUTO: 12.5 %
NEUTROPHILS # BLD AUTO: 3 10E9/L (ref 1.6–8.3)
NEUTROPHILS NFR BLD AUTO: 52.8 %
PLATELET # BLD AUTO: 230 10E9/L (ref 150–450)
RBC # BLD AUTO: 3.99 10E12/L (ref 3.8–5.2)
T4 FREE SERPL-MCNC: 0.91 NG/DL (ref 0.76–1.46)
TSH SERPL DL<=0.005 MIU/L-ACNC: 5.21 MU/L (ref 0.4–4)
WBC # BLD AUTO: 5.7 10E9/L (ref 4–11)

## 2018-03-20 PROCEDURE — 84703 CHORIONIC GONADOTROPIN ASSAY: CPT | Performed by: INTERNAL MEDICINE

## 2018-03-20 PROCEDURE — 85025 COMPLETE CBC W/AUTO DIFF WBC: CPT | Performed by: INTERNAL MEDICINE

## 2018-03-20 PROCEDURE — 84443 ASSAY THYROID STIM HORMONE: CPT | Performed by: INTERNAL MEDICINE

## 2018-03-20 PROCEDURE — 84439 ASSAY OF FREE THYROXINE: CPT | Performed by: INTERNAL MEDICINE

## 2018-03-20 PROCEDURE — 99213 OFFICE O/P EST LOW 20 MIN: CPT | Performed by: INTERNAL MEDICINE

## 2018-03-20 PROCEDURE — 36415 COLL VENOUS BLD VENIPUNCTURE: CPT | Performed by: INTERNAL MEDICINE

## 2018-03-20 NOTE — NURSING NOTE
"Chief Complaint   Patient presents with     Abnormal Bleeding Problem     last few periods have been \"erratic\", in pre menopause stage possibly, last period was 7 days long with 2 days being very heavy, 2 periods before that lasted about 10 days each, short intervals in between periods, some hot flashes       Initial /70 (BP Location: Left arm, Cuff Size: Adult Large)  Pulse 77  Temp 97.8  F (36.6  C) (Oral)  Ht 5' 5.25\" (1.657 m)  Wt 167 lb (75.8 kg)  LMP 03/10/2018  SpO2 96%  Breastfeeding? No  BMI 27.58 kg/m2 Estimated body mass index is 27.58 kg/(m^2) as calculated from the following:    Height as of this encounter: 5' 5.25\" (1.657 m).    Weight as of this encounter: 167 lb (75.8 kg).  Medication Reconciliation: complete   Betty Aguilar CMA      "

## 2018-03-20 NOTE — PROGRESS NOTES
"  SUBJECTIVE:   Nicole Tinajero is a 52 year old female who presents to clinic today for the following health issues:      Irregular periods    The patient reports that she has had irregular periods - every 4-7 weeks.  One period recently have pteocv64 days and only had 21 days in between menstruation.  The most recent period has been heavier.   Denies any abdominal or back pain.   Normal pap smear in 8/2016.  No h/o abnormal pap smears.   No chance of being pregnant.  vasectomy.     Problem list and histories reviewed & adjusted, as indicated.      Reviewed and updated as needed this visit by clinical staff  Tobacco  Allergies  Meds  Med Hx  Surg Hx  Fam Hx  Soc Hx      Reviewed and updated as needed this visit by Provider         ROS:  CONSTITUTIONAL: NEGATIVE for fever, chills, change in weight  RESP: NEGATIVE for significant cough or SOB  CV: NEGATIVE for chest pain, palpitations or peripheral edema  : see above    OBJECTIVE:     /70 (BP Location: Left arm, Cuff Size: Adult Large)  Pulse 77  Temp 97.8  F (36.6  C) (Oral)  Ht 5' 5.25\" (1.657 m)  Wt 167 lb (75.8 kg)  LMP 03/10/2018  SpO2 96%  Breastfeeding? No  BMI 27.58 kg/m2  Body mass index is 27.58 kg/(m^2).  GENERAL: healthy, alert and no distress  RESP: lungs clear to auscultation - no rales, rhonchi or wheezes  CV: regular rate and rhythm, normal S1 S2, no S3 or S4, no murmur, click or rub    ASSESSMENT/PLAN:       (N92.6) Irregular menstrual cycle  (primary encounter diagnosis)  Comment: assess for uterine abnormality and metabolic  Plan: US Pelvic Complete w Transvaginal, CBC with         platelets differential, TSH with free T4         reflex, OB/GYN REFERRAL, Beta HCG qual IFA         urine            (N92.0) Excessive or frequent menstruation  Comment:   Plan: US Pelvic Complete w Transvaginal, TSH with         free T4 reflex, OB/GYN REFERRAL            (Z12.11) Screen for colon cancer  Comment:   Plan: GASTROENTEROLOGY " ADULT REF PROCEDURE ONLY                June Narvaez MD  [facility]

## 2018-03-20 NOTE — MR AVS SNAPSHOT
After Visit Summary   3/20/2018    Nicole Tinajero    MRN: [id number]           Patient Information     Date Of Birth          1965        Visit Information        Provider Department      3/20/2018 9:00 AM June Narvaez MD [facility]        Today's Diagnoses     Irregular menstrual cycle    -  1    Excessive or frequent menstruation        Screen for colon cancer           Follow-ups after your visit        Additional Services     GASTROENTEROLOGY ADULT REF PROCEDURE ONLY       Last Lab Result: Creatinine (mg/dL)       Date                     Value                 08/17/2016               0.83             ----------  Body mass index is 27.58 kg/(m^2).     Needed:  No  Language:  English    Patient will be contacted to schedule procedure.     Please be aware that coverage of these services is subject to the terms and limitations of your health insurance plan.  Call member services at your health plan with any benefit or coverage questions.  Any procedures must be performed at a [place] facility OR coordinated by your clinic's referral office.    Please bring the following with you to your appointment:    (1) Any X-Rays, CTs or MRIs which have been performed.  Contact the facility where they were done to arrange for  prior to your scheduled appointment.    (2) List of current medications   (3) This referral request   (4) Any documents/labs given to you for this referral            OB/GYN REFERRAL       Your provider has referred you to:  [facility]: [facility] [phone]   [email or web address]      Please be aware that coverage of these services is subject to the terms and limitations of your health insurance plan.  Call member services at your health plan with any benefit or coverage questions.      Please bring the following with you to your appointment:    (1) Any X-Rays, CTs or MRIs which have been  performed.  Contact the facility where they were done to arrange for  prior to your scheduled appointment.   (2) List of current medications   (3) This referral request   (4) Any documents/labs given to you for this referral                  Your next 10 appointments already scheduled     Aug 17, 2018 10:00 AM CDT   Annual Visit with Ulises Sahu PA-C   [facility] ([facility])    [address]  [facility] [address] [phone]            Aug 17, 2018 10:30 AM CDT   Annual Visit with Logan Wl Diet 2, RD   [facility] ([facility])    [address]  [facility] [id number] [phone]              Future tests that were ordered for you today     Open Future Orders        Priority Expected Expires Ordered    US Pelvic Complete w Transvaginal Routine  3/20/2019 3/20/2018            Who to contact     If you have questions or need follow up information about today's clinic visit or your schedule please contact [facility] directly at [phone].  Normal or non-critical lab and imaging results will be communicated to you by [employer], letter or phone within 4 business days after the clinic has received the results. If you do not hear from us within 7 days, please contact the clinic through [employer] or phone. If you have a critical or abnormal lab result, we will notify you by phone as soon as possible.  Submit refill requests through [facility] or call your pharmacy and they will forward the refill request to us. Please allow 3 business days for your refill to be completed.          Additional Information About Your Visit        [employer] Information     [facility] gives you secure access to your electronic health record. If you see a primary care provider, you can also send messages to your care team and make appointments. If you have  "questions, please call your primary care clinic.  If you do not have a primary care provider, please call [phone] and they will assist you.        Care EveryWhere ID     This is your Care EveryWhere ID. This could be used by other organizations to access your [place] medical records  [id number]        Your Vitals Were     Pulse Temperature Height Last Period Pulse Oximetry Breastfeeding?    77 97.8  F (36.6  C) (Oral) 5' 5.25\" (1.657 m) 03/10/2018 96% No    BMI (Body Mass Index)                   27.58 kg/m2            Blood Pressure from Last 3 Encounters:   03/20/18 120/70   08/18/17 113/76   07/13/17 100/80    Weight from Last 3 Encounters:   03/20/18 167 lb (75.8 kg)   08/18/17 167 lb 11.2 oz (76.1 kg)   06/26/17 169 lb 9.6 oz (76.9 kg)              We Performed the Following     Beta HCG qual IFA urine     CBC with platelets differential     GASTROENTEROLOGY ADULT REF PROCEDURE ONLY     OB/GYN REFERRAL     TSH with free T4 reflex        Primary Care Provider Office Phone # Fax #    uJne Narvaez [phone]       [address] [facility] [address]        Equal Access to Services     HANNAH KOEHLER AH: Hadii aad ku hadasho Soomaali, waaxda luqadaha, qaybta kaalmada adeegyada, waxay preetiin haymashan sheri gomez. So [facility] [phone].    ATENCIÓN: Si habla español, tiene a amezquita disposición servicios gratuitos de asistencia lingüística. Llame al [phone].    We comply with applicable federal civil rights laws and [place] laws. We do not discriminate on the basis of race, color, national origin, age, disability, sex, sexual orientation, or gender identity.            Thank you!     Thank you for choosing [facility]  for your care. Our goal is always to provide you with excellent care. Hearing back from our patients is one way we can continue to improve our services. Please take a few minutes to complete the written survey that you may receive in the mail " after your visit with us. Thank you!             Your Updated Medication List - Protect others around you: Learn how to safely use, store and throw away your medicines at www.disposemymeds.org.          This list is accurate as of 3/20/18  9:49 AM.  Always use your most recent med list.                   Brand Name Dispense Instructions for use Diagnosis    BIOTIN PO      Take 10,000 mcg by mouth daily    Bariatric surgery status, Obesity (BMI 30.0-34.9)       CALCIUM CITRATE + D PO      Take 500 mg by mouth 2 times daily        CYANOCOBALAMIN SL      Place 1,000 mcg under the tongue every morning    Bariatric surgery status, Obesity (BMI 30-39.9)       docusate sodium 100 MG tablet    COLACE    60 tablet    Take 100 mg by mouth daily    Iron deficiency, Status post bariatric surgery, Malnutrition following gastrointestinal surgery       multivitamin  peds with iron 60 MG chewable tablet      Take 2 chew tab by mouth every morning    Obesity, Class III, BMI 40-49.9 (morbid obesity) (H)       VITAMIN D (CHOLECALCIFEROL) PO      Take 2,000 Units by mouth daily    Obesity, Class III, BMI 40-49.9 (morbid obesity) (H)       VITRON-C PO      Take 2 tablets by mouth every morning

## 2018-03-21 ENCOUNTER — RADIANT APPOINTMENT (OUTPATIENT)
Dept: ULTRASOUND IMAGING | Facility: CLINIC | Age: 53
End: 2018-03-21
Attending: INTERNAL MEDICINE
Payer: COMMERCIAL

## 2018-03-21 DIAGNOSIS — N92.0 EXCESSIVE OR FREQUENT MENSTRUATION: ICD-10-CM

## 2018-03-21 DIAGNOSIS — N92.6 IRREGULAR MENSTRUAL CYCLE: ICD-10-CM

## 2018-03-21 PROCEDURE — 76830 TRANSVAGINAL US NON-OB: CPT | Performed by: FAMILY MEDICINE

## 2018-03-21 PROCEDURE — 76856 US EXAM PELVIC COMPLETE: CPT | Performed by: FAMILY MEDICINE

## 2018-04-12 ENCOUNTER — TELEPHONE (OUTPATIENT)
Dept: SURGERY | Facility: CLINIC | Age: 53
End: 2018-04-12

## 2018-04-12 NOTE — TELEPHONE ENCOUNTER
Patient had gastric bypass 8/17/16.    She calls today because she is having a colonoscopy next week and in her pre care it states she needs to be off iron X 7 day.  She is wondering if this is okay.  Answer: Yes, this is okay for 7 days.    She is also wondering if she should stop her Colace during the time without iron.  Answer: Yes Colace isn't needed when not on iron.     Patient verbalized understanding and is agreeable to plan.    She is also wondering if she had her gallbladder taken out with her gastric bypass.  Answer: looked at op report and no she still has her gallbladder.    Keeley Xiong, MS, RD, RN

## 2018-04-16 ENCOUNTER — OFFICE VISIT (OUTPATIENT)
Dept: OBGYN | Facility: CLINIC | Age: 53
End: 2018-04-16
Payer: COMMERCIAL

## 2018-04-16 VITALS
DIASTOLIC BLOOD PRESSURE: 72 MMHG | SYSTOLIC BLOOD PRESSURE: 120 MMHG | HEART RATE: 68 BPM | BODY MASS INDEX: 28.9 KG/M2 | WEIGHT: 175 LBS

## 2018-04-16 DIAGNOSIS — N83.202 CYST OF LEFT OVARY: ICD-10-CM

## 2018-04-16 DIAGNOSIS — N92.0 EXCESSIVE OR FREQUENT MENSTRUATION: Primary | ICD-10-CM

## 2018-04-16 PROBLEM — D25.1 INTRAMURAL, SUBMUCOUS, AND SUBSEROUS LEIOMYOMA OF UTERUS: Status: ACTIVE | Noted: 2018-04-16

## 2018-04-16 PROBLEM — D25.0 INTRAMURAL, SUBMUCOUS, AND SUBSEROUS LEIOMYOMA OF UTERUS: Status: ACTIVE | Noted: 2018-04-16

## 2018-04-16 PROBLEM — D25.2 INTRAMURAL, SUBMUCOUS, AND SUBSEROUS LEIOMYOMA OF UTERUS: Status: ACTIVE | Noted: 2018-04-16

## 2018-04-16 LAB — CANCER AG125 SERPL-ACNC: 43 U/ML (ref 0–30)

## 2018-04-16 PROCEDURE — 86304 IMMUNOASSAY TUMOR CA 125: CPT | Performed by: OBSTETRICS & GYNECOLOGY

## 2018-04-16 PROCEDURE — 99203 OFFICE O/P NEW LOW 30 MIN: CPT | Performed by: OBSTETRICS & GYNECOLOGY

## 2018-04-16 PROCEDURE — 36415 COLL VENOUS BLD VENIPUNCTURE: CPT | Performed by: OBSTETRICS & GYNECOLOGY

## 2018-04-16 RX ORDER — DROSPIRENONE AND ETHINYL ESTRADIOL 0.02-3(28)
1 KIT ORAL DAILY
Qty: 28 TABLET | Refills: 3 | Status: SHIPPED | OUTPATIENT
Start: 2018-04-16

## 2018-04-16 NOTE — MR AVS SNAPSHOT
After Visit Summary   4/16/2018    Nicole Tinajero    MRN: [id number]           Patient Information     Date Of Birth          1965        Visit Information        Provider Department      4/16/2018 2:45 PM Dante Serna MD [facility]        Today's Diagnoses     Excessive or frequent menstruation    -  1    Cyst of left ovary           Follow-ups after your visit        Your next 10 appointments already scheduled     Apr 20, 2018   Procedure with Austin Kahn MD   [facility] ([facility])    [address] [facility] [address]   [phone]           [facility] is located at [address] [facility]. [place]            Aug 17, 2018 10:00 AM CDT   Annual Visit with Ulises Sahu PA-C   [facility] [place] ([facility])    [facility] [address] [id number] [phone]            Aug 17, 2018 10:30 AM CDT   Annual Visit with Logan Ross 2, RD   [facility] ([facility])    [facility] [address] [address] [phone]              Who to contact     If you have questions or need follow up information about today's clinic visit or your schedule please contact [facility] directly at [phone].  Normal or non-critical lab and imaging results will be communicated to you by MyChart, letter or phone within 4 business days after the clinic has received the results. If you do not hear from us within 7 days, please contact the clinic through MyChart or phone. If you have a critical or abnormal lab result, we will notify you by phone as soon as possible.  Submit refill requests through [employer] or call your pharmacy and they will forward the refill request to us. Please allow 3 business days for your refill to be completed.          Additional  Information About Your Visit        [employer] Information     [employer] gives you secure access to your electronic health record. If you see a primary care provider, you can also send messages to your care team and make appointments. If you have questions, please call your primary care clinic.  If you do not have a primary care provider, please call [phone] and they will assist you.        Care EveryWhere ID     This is your Care EveryWhere ID. This could be used by other organizations to access your [facility] medical records  [id number]        Your Vitals Were     Pulse Last Period BMI (Body Mass Index)             68 03/10/2018 (Exact Date) 28.9 kg/m2          Blood Pressure from Last 3 Encounters:   04/16/18 120/72   03/20/18 120/70   08/18/17 113/76    Weight from Last 3 Encounters:   04/16/18 175 lb (79.4 kg)   03/20/18 167 lb (75.8 kg)   08/18/17 167 lb 11.2 oz (76.1 kg)              We Performed the Following               Today's Medication Changes          These changes are accurate as of 4/16/18  6:04 PM.  If you have any questions, ask your nurse or doctor.               Start taking these medicines.        Dose/Directions    drospirenone-ethinyl estradiol 3-0.02 MG per tablet   Commonly known as:  PRASHANT   Used for:  Excessive or frequent menstruation   Started by:  Dante Serna MD        Dose:  1 tablet   Take 1 tablet by mouth daily   Quantity:  28 tablet   Refills:  3            Where to get your medicines      These medications were sent to [facility]/pharmacy #[id number] - [place], [place] - [address]  [facility]  [address] , [facility] [address]     Phone:  [phone]     drospirenone-ethinyl estradiol 3-0.02 MG per tablet                Primary Care Provider Office Phone # Fax #    June Narvaez -[phone]       [address]  [facility] [address]        Equal Access to Services     HANNAH KOEHLER AH: Dick Saha, jian pennington, yani bruce  josué oreillyrios charanjitzelda edward'aan ah. So [facility] [phone].    ATENCIÓN: Si habla smith, tiene a amezquita disposición servicios gratuitos de asistencia lingüística. Connie al [phone].    We comply with applicable federal civil rights laws and [place] laws. We do not discriminate on the basis of race, color, national origin, age, disability, sex, sexual orientation, or gender identity.            Thank you!     Thank you for choosing [facility]  for your care. Our goal is always to provide you with excellent care. Hearing back from our patients is one way we can continue to improve our services. Please take a few minutes to complete the written survey that you may receive in the mail after your visit with us. Thank you!             Your Updated Medication List - Protect others around you: Learn how to safely use, store and throw away your medicines at www.disposemymeds.org.          This list is accurate as of 4/16/18  6:04 PM.  Always use your most recent med list.                   Brand Name Dispense Instructions for use Diagnosis    BIOTIN PO      Take 10,000 mcg by mouth daily    Bariatric surgery status, Obesity (BMI 30.0-34.9)       CALCIUM CITRATE + D PO      Take 500 mg by mouth 2 times daily        CYANOCOBALAMIN SL      Place 1,000 mcg under the tongue every morning    Bariatric surgery status, Obesity (BMI 30-39.9)       docusate sodium 100 MG tablet    COLACE    60 tablet    Take 100 mg by mouth daily    Iron deficiency, Status post bariatric surgery, Malnutrition following gastrointestinal surgery       drospirenone-ethinyl estradiol 3-0.02 MG per tablet    PRASHANT    28 tablet    Take 1 tablet by mouth daily    Excessive or frequent menstruation       multivitamin  peds with iron 60 MG chewable tablet      Take 2 chew tab by mouth every morning    Obesity, Class III, BMI 40-49.9 (morbid obesity) (H)       VITAMIN D (CHOLECALCIFEROL) PO      Take 2,000 Units by mouth daily     Obesity, Class III, BMI 40-49.9 (morbid obesity) (H)       VITRON-C PO      Take 2 tablets by mouth every morning

## 2018-04-16 NOTE — PROGRESS NOTES
SUBJECTIVE:  Nicole Tinajero is an 52 year old  P2 woman who presents for discussion of abnormal bleeding      Menses regular and normal until about one year ago      -since then; bleeding every 4 - 8 weeks; usually duration of 5-7 days, though occasionally as long as 10 days           -not associated with significant dsymenorrhea      -not using HRT      -recent Hg is normal      -recent thyroid screen; subclinical hypothyroid      -recent ultrasound; multiple leiomyoma, including submucous             -small complex ovarian cyst          Past Medical History:   Diagnosis Date     Hypertension     no problems since gastric bypass Aug 2016     Urinary incontinence      Past Surgical History:   Procedure Laterality Date     ABDOMEN SURGERY       GASTRIC BYPASS      & hiatal hernia repair     HERNIA REPAIR      hiatal hernia repair with gastric bypass     LAPAROSCOPIC BYPASS GASTRIC, CHOLECYSTECTOMY, COMBINED N/A 2016     SLING TRANSPUBO WITHOUT ANTERIOR COLPORRHAPHY N/A 2017    Procedure: SLING TRANSPUBO WITHOUT ANTERIOR COLPORRHAPHY;  pubovaginal sling (altis);  Surgeon: Raad Rhoades MD;  Location:  OR     Current Outpatient Prescriptions   Medication     drospirenone-ethinyl estradiol (PRASHANT) 3-0.02 MG per tablet     BIOTIN PO     Iron-Vitamin C (VITRON-C PO)     Calcium Citrate-Vitamin D (CALCIUM CITRATE + D PO)     CYANOCOBALAMIN SL     VITAMIN D, CHOLECALCIFEROL, PO     multivitamin  peds with iron (FLINTSTONES COMPLETE) 60 MG chewable tablet     docusate sodium (COLACE) 100 MG tablet     No current facility-administered medications for this visit.      Allergies   Allergen Reactions     Sulfa Drugs Hives     Rash/hives       Social History   Substance Use Topics     Smoking status: Never Smoker     Smokeless tobacco: Never Used     Alcohol use Yes      Comment: once per month - none since Aug 2016       Review of Systems  CONSTITUTIONAL:NEGATIVE  EYES: NEGATIVE  ENT/MOUTH:  NEGATIVE  RESP: NEGATIVE  CV: NEGATIVE  GI: NEGATIVE  : NEGATIVE  MUSCULOSKELATAL: NEGATIVE  INTEGUMENTARY/SKIN: NEGATIVE  BREAST: NEGATIVE  NEURO: NEGATIVE  ENDOCRINE: NEGATIVE  HEME/ALLERGY/IMMUNE: NEGATIVE  PSYCHIATRIC: NEGATIVE    OBJECTIVE:  /72 (BP Location: Left arm, Patient Position: Chair, Cuff Size: Adult Regular)  Pulse 68  Wt 175 lb (79.4 kg)  LMP 03/10/2018 (Exact Date)  BMI 28.9 kg/m2   EXAM:  GENERAL APPEARANCE: healthy, alert and no distress  ABDOMEN; negative        ASSESSMENT:  Menorrhagia      -differential diagnosis (PALM-COEIN) and pathophysiology reviewed over 30 minutes           -hormonal, leiomyoma, adenomyosis, endometriosis, abnormal endometrium, endometrial polyp      -therapies reviewed; hormonal, Mirena, endometrial ablation, and hysterectomy      -may need need thyroid hormone  Will try OCP; if successful, will consider stopping in 12 months  Ovarian cyst; CA-125, if normal, repeat ultrasound in 2-3 months    PLAN:  (N92.0) Excessive or frequent menstruation  (primary encounter diagnosis)  Plan: drospirenone-ethinyl estradiol (PRASHANT) 3-0.02 MG         per tablet          (N83.202) Cyst of left ovary  Plan:                Health Maintenance   Topic Date Due     COLON CANCER SCREEN (SYSTEM ASSIGNED)  05/06/2017     INFLUENZA VACCINE (SYSTEM ASSIGNED)  09/01/2018     MAMMO Q1 YR  02/12/2019     PAP Q3 YR  08/24/2019     LIPID SCREEN Q5 YR FEMALE (SYSTEM ASSIGNED)  04/08/2021     TETANUS IMMUNIZATION (SYSTEM ASSIGNED)  08/12/2026

## 2018-04-16 NOTE — NURSING NOTE
"Chief Complaint   Patient presents with     RECHECK     follow-up regarding US done 03/21/18 and irregular menses       Initial /72 (BP Location: Left arm, Patient Position: Chair, Cuff Size: Adult Regular)  Pulse 68  Wt 175 lb (79.4 kg)  LMP 03/10/2018 (Exact Date)  BMI 28.9 kg/m2 Estimated body mass index is 28.9 kg/(m^2) as calculated from the following:    Height as of 3/20/18: 5' 5.25\" (1.657 m).    Weight as of this encounter: 175 lb (79.4 kg).  Medication Reconciliation: complete     Nurse assisted visit.  Sera Rogers MA.      "

## 2018-04-18 ENCOUNTER — MYC MEDICAL ADVICE (OUTPATIENT)
Dept: INTERNAL MEDICINE | Facility: CLINIC | Age: 53
End: 2018-04-18

## 2018-04-18 DIAGNOSIS — R79.89 ABNORMAL TSH: Primary | ICD-10-CM

## 2018-04-19 ENCOUNTER — MYC MEDICAL ADVICE (OUTPATIENT)
Dept: OBGYN | Facility: CLINIC | Age: 53
End: 2018-04-19

## 2018-04-19 NOTE — TELEPHONE ENCOUNTER
Pt sent [place] message with updates regarding health.     Dr. Serna recommended she see Dr. Narvaez or Endocrinologist for her abnormal thyroid results. Pt asks if Dr. Narvaez can see her for this or if she wants to refer her to a specialist.    Pt also provides update that Dr. Serna did a test to see if the ovarian cyst could be cancer and the test came back high. He does not think this is cancer, but referred her to Dr. Yuly Sahni, Gynecologic Oncologist for further evaluation.

## 2018-04-20 ENCOUNTER — HOSPITAL ENCOUNTER (OUTPATIENT)
Facility: CLINIC | Age: 53
Discharge: HOME | End: 2018-04-20
Attending: INTERNAL MEDICINE | Admitting: INTERNAL MEDICINE
Payer: COMMERCIAL

## 2018-04-20 VITALS
SYSTOLIC BLOOD PRESSURE: 107 MMHG | RESPIRATION RATE: 16 BRPM | OXYGEN SATURATION: 96 % | HEIGHT: 66 IN | BODY MASS INDEX: 27.32 KG/M2 | WEIGHT: 170 LBS | DIASTOLIC BLOOD PRESSURE: 75 MMHG

## 2018-04-20 LAB — COLONOSCOPY: NORMAL

## 2018-04-20 PROCEDURE — 45378 DIAGNOSTIC COLONOSCOPY: CPT | Performed by: INTERNAL MEDICINE

## 2018-04-20 PROCEDURE — G0500 MOD SEDAT ENDO SERVICE >5YRS: HCPCS | Performed by: INTERNAL MEDICINE

## 2018-04-20 PROCEDURE — G0121 COLON CA SCRN NOT HI RSK IND: HCPCS | Performed by: INTERNAL MEDICINE

## 2018-04-20 PROCEDURE — 25000128 H RX IP 250 OP 636: Performed by: INTERNAL MEDICINE

## 2018-04-20 RX ORDER — ONDANSETRON 2 MG/ML
4 INJECTION INTRAMUSCULAR; INTRAVENOUS EVERY 6 HOURS PRN
Status: DISCONTINUED | OUTPATIENT
Start: 2018-04-20 | End: 2018-04-20 | Stop reason: HOSPADM

## 2018-04-20 RX ORDER — FENTANYL CITRATE 50 UG/ML
INJECTION, SOLUTION INTRAMUSCULAR; INTRAVENOUS PRN
Status: DISCONTINUED | OUTPATIENT
Start: 2018-04-20 | End: 2018-04-20 | Stop reason: HOSPADM

## 2018-04-20 RX ORDER — FLUMAZENIL 0.1 MG/ML
0.2 INJECTION, SOLUTION INTRAVENOUS
Status: DISCONTINUED | OUTPATIENT
Start: 2018-04-20 | End: 2018-04-20 | Stop reason: HOSPADM

## 2018-04-20 RX ORDER — LIDOCAINE 40 MG/G
CREAM TOPICAL
Status: DISCONTINUED | OUTPATIENT
Start: 2018-04-20 | End: 2018-04-20 | Stop reason: HOSPADM

## 2018-04-20 RX ORDER — ONDANSETRON 2 MG/ML
4 INJECTION INTRAMUSCULAR; INTRAVENOUS
Status: DISCONTINUED | OUTPATIENT
Start: 2018-04-20 | End: 2018-04-20 | Stop reason: HOSPADM

## 2018-04-20 RX ORDER — ONDANSETRON 4 MG/1
4 TABLET, ORALLY DISINTEGRATING ORAL EVERY 6 HOURS PRN
Status: DISCONTINUED | OUTPATIENT
Start: 2018-04-20 | End: 2018-04-20 | Stop reason: HOSPADM

## 2018-04-20 RX ORDER — NALOXONE HYDROCHLORIDE 0.4 MG/ML
.1-.4 INJECTION, SOLUTION INTRAMUSCULAR; INTRAVENOUS; SUBCUTANEOUS
Status: DISCONTINUED | OUTPATIENT
Start: 2018-04-20 | End: 2018-04-20 | Stop reason: HOSPADM

## 2018-04-20 NOTE — LETTER
March 23, 2018      Nicole Tinajero  [address]  [facility] [address]        Dear Nicole,         Thank you for choosing [facility]. You are scheduled for the following service.     Date:  April 20, 2018 - Friday             Procedure:  COLONOSCOPY  Doctor:        Austin Kahn   Arrival Time:  8:30 am  *check in at Emergency/Endoscopy desk*  Procedure Time:  9:00 am    Location:   [facility]        Endoscopy Department, First Floor (Enter through ER Doors) *        [address] [place], [place] [address]      [phone] or [phone] () to reschedule      MIRALAX -GATORADE  PREP  Colonoscopy is the most accurate test to detect colon polyps and colon cancer; and the only test where polyps can be removed. During this procedure, a doctor examines the lining of your large intestine and rectum through a flexible tube.           Transportation  Arrange for a ride for the day of your procedure with a responsible adult.  A taxi ride is not an option unless you are accompanied by a responsible adult. If you fail to arrange transportation with a responsible adult, your procedure will be cancelled and rescheduled.    Purchase the  following supplies at your local pharmacy:  - 2 (two) bisacodyl tablets: each tablet contains 5 mg.  (Dulcolax  laxative NOT Dulcolax  stool softener)   - 1 (one) 8.3 oz bottle of Polyethylene Glycol (PEG) 3350 Powder   (MiraLAX , Smooth LAX , ClearLAX  or equivalent)  - 64 oz Gatorade    Regular Gatorade, Gatorade G2 , Powerade , Powerade Zero  or Pedialyte  is acceptable. Red colored flavors are not allowed; all other colors (yellow, green, orange, purple and blue) are okay. It is also okay to buy two 2.12 oz packets of powdered Gatorade that can be mixed with water to a total volume of 64 oz of liquid.  - 1 (one) 10 oz bottle of Magnesium Citrate (Red colored flavors are not allowed)  It is also okay for you to use a 0.5  oz package of powdered magnesium citrate (17 g) mixed with 10 oz of water.    PREPARATION FOR COLONOSCOPY    7 days before:    Discontinue fiber supplements and medications containing iron. This includes Metamucil  and Fibercon ; and multivitamins with iron.  3 days before:    Begin a low-fiber diet. A low-fiber diet helps making the cleanout more effective.     Examples of a low-fiber diet include (but are not limited to): white bread, white rice, pasta, crackers, fish, chicken, eggs, ground beef, creamy peanut butter, cooked/steamed/boiled vegetables, canned fruit, bananas, melons, milk, plain yogurt cheese, salad dressing and other condiments.     The following are not allowed on a low-fiber diet: seeds, nuts, popcorn, bran, whole wheat, corn, quinoa, raw fruits and vegetables, berries and dried fruit, beans and lentils.    For additional details on low-fiber diet, please refer to the table on the last page.  2 days before:    Continue the low-fiber diet.     Drink at least 8 glasses of water throughout the day.     Stop eating solid foods at 11:45 pm.  1 day before:    In the morning: begin a clear liquid diet (liquids you can see through).     Examples of a clear liquid diet include: water, clear broth or bouillon, Gatorade, Pedialyte or Powerade, carbonated and non-carbonated soft drinks (Sprite , 7-Up , ginger ale), strained fruit juices without pulp (apple, white grape, white cranberry), Jell-O  and popsicles.     The following are not allowed on a clear liquid diet: red liquids, alcoholic beverages, coffee, dairy products (milk, creamer, and yogurt), protein shakes, creamy broths, juice with pulp and chewing tobacco.    At noon: take 2 (two) bisacodyl tablets     At 4 (and no later than 6pm): start drinking the Miralax-Gatorade preparation (8.3 oz of Miralax mixed with 64 oz of Gatorade in a large pitcher). Drink 1(one) 8 oz glass every 15 minutes thereafter, until the mixture is gone.    COLON CLEANSING  TIPS: drink adequate amounts of fluids before and after your colon cleansing to prevent dehydration. Stay near a toilet because you will have diarrhea. Even if you are sitting on the toilet, continue to drink the cleansing solution every 15 minutes. If you feel nauseous or vomit, rinse your mouth with water, take a 15 to 30-minute-break and then continue drinking the solution. You will be uncomfortable until the stool has flushed from your colon (in about 2 to 4 hours). You may feel chilled.              Day of your procedure  You may take all of your morning medications including blood pressure medications, blood thinners (if you have not been instructed to stop these by our office), methadone, anti-seizure medications with sips of water 3 hours prior to your procedure or earlier. Do not take insulin or vitamins prior to your procedure. Continue the clear liquid diet.   4 hours prior: drink 10 oz of magnesium citrate. It may be easier to drink it with a straw.    STOP consuming all liquids after that.     Do not take anything by mouth during this time.     Allow extra time to travel to your procedure as you may need to stop and use a restroom along the way.  You are ready for the procedure, if you followed all instructions and your stool is no longer formed, but clear or yellow liquid. If you are unsure whether your colon is clean, please call our office at [phone] before you leave for your appointment.  Bring the following to your procedure:  - Insurance Card/Photo ID.   - List of current medications including over-the-counter medications and supplements.   - Your rescue inhaler if you currently use one to control asthma.      Canceling or rescheduling your appointment:   If you must cancel or reschedule your appointment, please call [phone] as soon as possible.      COLONOSCOPY PRE-PROCEDURE CHECKLIST  If you have diabetes, ask your regular doctor for diet and medication restrictions.  If you take an  anticoagulant or anti-platelet medication (such as Coumadin , Lovenox , Pradaxa , Xarelto , Eliquis , etc.), please call your primary doctor for advice on holding this medication.  If you take aspirin you may continue to do so.  If you are or may be pregnant, please discuss the risks and benefits of this procedure with your doctor.          What happens during a colonoscopy?    Plan to spend up to two hours, starting at registration time, at the endoscopy center the day of your procedure. The colonoscopy takes an average of 15 to 30 minutes. Recovery time is about 30 minutes.    Before the exam:    You will change into a gown.    Your medical history and medication list will be reviewed with you, unless that has been done over the phone prior to the procedure.     A nurse will insert an intravenous (IV) line into your hand or arm.    The doctor will meet with you and will give you a consent form to sign.    During the exam:     Medicine will be given through the IV line to help you relax.     Your heart rate and oxygen levels will be monitored. If your blood pressure is low, you may be given fluids through the IV line.     The doctor will insert a flexible hollow tube, called a colonoscope, into your rectum. The scope will be advanced slowly through the large intestine (colon).    You may have a feeling of fullness or pressure.     If an abnormal tissue or a polyp is found, the doctor may remove it through the endoscope for closer examination, or biopsy. Tissue removal is painless    After the exam:           Any tissue samples removed during the exam will be sent to a lab for evaluation. It may take 5-7 working days for you to be notified of the results.     A nurse will provide you with complete discharge instructions before you leave the endoscopy center. Be sure to ask the nurse for specific instructions if you take blood thinners such as Aspirin, Coumadin or Plavix.     The doctor will prepare a full report for  you and for the physician who referred you for the procedure.     Your doctor will talk with you about the initial results of your exam.      Medication given during the exam will prohibit you from driving for the rest of the day.     Following the exam, you may resume your normal diet. Your first meal should be light, no greasy foods. Avoid alcohol until the next day.     You may resume your regular activities the day after the procedure.     LOW-FIBER DIET    Foods RECOMMENDED Foods to AVOID   Breads, Cereal, Rice and Pasta:   White bread, rolls, biscuits, croissant and donato toast.   Waffles, Kazakh toast and pancakes.   White rice, noodles, pasta, macaroni and peeled cooked potatoes.   Plain crackers and saltines.   Cooked cereals: farina, cream of rice.   Cold cereals: Puffed Rice , Rice Krispies , Corn Flakes  and Special K    Breads, Cereal, Rice and Pasta:   Breads or rolls with nuts, seeds or fruit.   Whole wheat, pumpernickel, rye breads and cornbread.   Potatoes with skin, brown or wild rice, and kasha (buckwheat).     Vegetables:   Tender cooked and canned vegetables without seeds: carrots, asparagus tips, green or wax beans, pumpkin, spinach, lima beans. Vegetables:   Raw or steamed vegetables.   Vegetables with seeds.   Sauerkraut.   Winter squash, peas, broccoli, Brussel sprouts, cabbage, onions, cauliflower, baked beans, peas and corn.   Fruits:   Strained fruit juice.   Canned fruit, except pineapple.   Ripe bananas and melon. Fruits:   Prunes and prune juice.   Raw fruits.   Dried fruits: figs, dates and raisins.   Milk/Dairy:   Milk: plain or flavored.   Yogurt, custard and ice cream.   Cheese and cottage cheese Milk/Dairy:     Meat and other proteins:   ground, well-cooked tender beef, lamb, ham, veal, pork, fish, poultry and organ meats.   Eggs.   Peanut butter without nuts. Meat and other proteins:   Tough, fibrous meats with gristle.   Dry beans, peas and lentils.   Peanut butter with  nuts.   Tofu.   Fats, Snack, Sweets, Condiments and Beverages:   Margarine, butter, oils, mayonnaise, sour cream and salad dressing, plain gravy.   Sugar, hard candy, clear jelly, honey and syrup.   Spices, cooked herbs, bouillon, broth and soups made with allowed vegetable, ketchup and mustard.   Coffee, tea and carbonated drinks.   Plain cakes, cookies and pretzels.   Gelatin, plain puddings, custard, ice cream, sherbet and popsicles. Fats, Snack, Sweets, Condiments and Beverages:   Nuts, seeds and coconut.   Jam, marmalade and preserves.   Pickles, olives, relish and horseradish.   All desserts containing nuts, seeds, dried fruit and coconut; or made from whole grains or bran.   Candy made with nuts or seeds.   Popcorn.                     DIRECTIONS TO THE ENDOSCOPY DEPARTMENT     From the north ([facility])  Take [address] South, exit on [address] [address]. Get into the left hand jessica, turn left (east), go one-half mile to [address] and turn left. Go north to the first stoplight, take a right on [address] and follow it to the Emergency entrance.    From the south ([facility])  Take [address]N to the [address]E split and exit on [address] [address]. On [address] [address], turn left (west) to [address]. Turn right (north) on [address]. Go north to the first stoplight, take a right on [address] and follow it to the Emergency entrance.    From the east via 35E ([facility])  Take [address] south to [address] [address] exit. Turn right on [address] [address]. Go west to [address]. Turn right (north) on [address]. Go to the first stoplight, take a right and follow on [address] to the Emergency entrance.    From the east via [address] ([facility])  Take [address] to [address]. Turn left (south) on [address] to [address]. Turn left (east) on [address] and follow it to the Emergency entrance.    From the west via [address] ([place], [place])  Take  [address] east to [address]. Turn right (south) on [address] to [employer]. Turn left (east) on [address] and follow it to the Emergency entrance.

## 2018-04-20 NOTE — TELEPHONE ENCOUNTER
TSH between 4-10 is controversial whether or not to treat.    Recommend repeating thyroid labs in 6 weeks.

## 2018-04-20 NOTE — H&P
Pre-Endoscopy History and Physical     Nicole Tinajero MRN# [id number]   YOB: 1965 Age: 52 year old     Date of Procedure: 4/20/2018  Primary care provider: June Narvaez  Type of Endoscopy: Colonoscopy with possible biopsy, possible polypectomy  Reason for Procedure: screen  Type of Anesthesia Anticipated: Conscious Sedation    HPI:    Nicole is a 52 year old female who will be undergoing the above procedure.      A history and physical has been performed. The patient's medications and allergies have been reviewed. The risks and benefits of the procedure and the sedation options and risks were discussed with the patient.  All questions were answered and informed consent was obtained.      She denies a personal or family history of anesthesia complications or bleeding disorders.     Patient Active Problem List   Diagnosis     CARDIOVASCULAR SCREENING; LDL GOAL LESS THAN 130     Bariatric surgery status     Malnutrition following gastrointestinal surgery     BMI 27.0-27.9,adult     Intramural, submucous, and subserous leiomyoma of uterus     Excessive or frequent menstruation     Cyst of left ovary        Past Medical History:   Diagnosis Date     Hypertension     no problems since gastric bypass Aug 2016     Urinary incontinence         Past Surgical History:   Procedure Laterality Date     ABDOMEN SURGERY       GASTRIC BYPASS      & hiatal hernia repair     HERNIA REPAIR      hiatal hernia repair with gastric bypass     LAPAROSCOPIC BYPASS GASTRIC, CHOLECYSTECTOMY, COMBINED N/A 8/17/2016    gallbladder NOT taken out-----still in     SLING TRANSPUBO WITHOUT ANTERIOR COLPORRHAPHY N/A 6/19/2017    Procedure: SLING TRANSPUBO WITHOUT ANTERIOR COLPORRHAPHY;  pubovaginal sling (altis);  Surgeon: Raad Rhoades MD;  Location:  OR       Social History   Substance Use Topics     Smoking status: Never Smoker     Smokeless tobacco: Never Used     Alcohol use Yes      Comment: once per month - none  "since Aug 2016       Family History   Problem Relation Age of Onset     CANCER Father      Hypertension Father      Chronic Obstructive Pulmonary Disease Mother      Hypertension Mother      Depression Mother      Anxiety Disorder Mother      Obesity Mother      Colon Cancer No family hx of        Prior to Admission medications    Medication Sig Start Date End Date Taking? Authorizing Provider   BIOTIN PO Take 10,000 mcg by mouth daily   Yes Reported, Patient   Calcium Citrate-Vitamin D (CALCIUM CITRATE + D PO) Take 500 mg by mouth 2 times daily    Yes Reported, Patient   CYANOCOBALAMIN SL Place 1,000 mcg under the tongue every morning    Yes Reported, Patient   docusate sodium (COLACE) 100 MG tablet Take 100 mg by mouth daily 2/17/17  Yes Barbra Mclain PA-C   Iron-Vitamin C (VITRON-C PO) Take 2 tablets by mouth every morning    Yes Reported, Patient   multivitamin  peds with iron (FLINTSTONES COMPLETE) 60 MG chewable tablet Take 2 chew tab by mouth every morning    Yes Reported, Patient   VITAMIN D, CHOLECALCIFEROL, PO Take 2,000 Units by mouth daily   Yes Reported, Patient   drospirenone-ethinyl estradiol (PRASHANT) 3-0.02 MG per tablet Take 1 tablet by mouth daily 4/16/18   Dante Serna MD       Allergies   Allergen Reactions     Sulfa Drugs Hives     Rash/hives          REVIEW OF SYSTEMS:   5 point ROS negative except as noted above in HPI, including Gen., Resp., CV, GI &  system review.    PHYSICAL EXAM:   Ht 1.664 m (5' 5.5\")  Wt 77.1 kg (170 lb)  BMI 27.86 kg/m2 Estimated body mass index is 27.86 kg/(m^2) as calculated from the following:    Height as of this encounter: 1.664 m (5' 5.5\").    Weight as of this encounter: 77.1 kg (170 lb).   GENERAL APPEARANCE: alert, and oriented  MENTAL STATUS: alert  AIRWAY EXAM: Mallampatti Class I (visualization of the soft palate, fauces, uvula, anterior and posterior pillars)  RESP: lungs clear to auscultation - no rales, rhonchi or wheezes  CV: regular " rates and rhythm  DIAGNOSTICS:    Not indicated    IMPRESSION   ASA Class 1 - Healthy patient, no medical problems    PLAN:   Plan for Colonoscopy with possible biopsy, possible polypectomy. We discussed the risks, benefits and alternatives and the patient wished to proceed.    The above has been forwarded to the consulting provider.      Signed Electronically by: Austin Kahn  April 20, 2018

## 2018-04-23 ENCOUNTER — MYC MEDICAL ADVICE (OUTPATIENT)
Dept: OBGYN | Facility: CLINIC | Age: 53
End: 2018-04-23

## 2018-05-18 ENCOUNTER — CARE COORDINATION (OUTPATIENT)
Dept: ONCOLOGY | Facility: CLINIC | Age: 53
End: 2018-05-18

## 2018-05-18 NOTE — PROGRESS NOTES
Phoned patient today for Pre-visit welcome call.  Patient is  Available and appointment confirmed with pt.    Scheduled for:  Date:  5/22/18   Time:  10 am  with :  Kaitlyn    Patient requested:  Please check in at [address]  [facility], [place].  Please Arrive 15 minutes before your appointment time.    Please Bring:  Insurance Card(s)  Photo ID      Patient is a [facility] patient and all records, pathology reports, surgical reports are in EMR.  MD will be able to preview chart prior to visit.  Patient is aware that parking is free in both an open or covered lot.  First visit will take last approx 30-60 mins.  The clinic is open M-F 8-4:30pm and I am here the same hours.   If you have any questions, please don't hesitate to call and ask for the Patient Care Coordinator @ [phone] option 3.       Leann Gordon, RN, BSN

## 2018-05-20 NOTE — PROGRESS NOTES
Consult Notes on Referred Patient        Dr. Dante Serna MD  [address] [facility]  [address]  [place] [address]       RE: Nicole Tinajero  : 1965  PREET: 2018    Dear Dr. Dante Serna:    I had the pleasure of seeing your patient Nicole Tinajero here at the Gynecologic Cancer Clinic at the [facility] on 2018.  As you know she is a very pleasant 53 year old woman with a recent diagnosis of pelvic mass.  Given these findings she was subsequently sent to the Gynecologic Cancer Clinic for new patient consultation.  She is accompanied today by her .    She has been experiencing changes in her menstrual bleeding for the past year or so.  Her menses have become very irregular with irregular intervals and differences in flow.  She will at times have a light menses and then her next one will be very heavy.  She thus had an US which revealed a complex adnexal cyst.  She is having menopausal symptoms including hot flashes/night sweats.    3/21/18:  Uterus measures 11.3 x 8.6 x 6.9 cm with multiple 3-5 cm leiomyoma, EMS 4.4 mm, Right ovary is 3.5 x 4.3 x 3.2 cm and is normal, Left ovary is 4.9 x 4.2 x 3.7 cm with a 2.6 x 3.2 x 2.3 cm complex cyst    18:   = 43    Review of Systems:  Systemic           no weight changes; no fever; no chills; + night sweats; no appetite changes  Skin           no rashes, or lesions  Eye           no irritation; no changes in vision  Brinda-Laryngeal           no dysphagia; no hoarseness   Pulmonary    no cough; no shortness of breath  Cardiovascular    no chest pain; no palpitations  Gastrointestinal    no diarrhea; no constipation; no abdominal pain; no changes in bowel  habits; no blood in stool  Genitourinary   no urinary frequency; no urinary urgency; no dysuria; no pain; no abnormal vaginal discharge; + abnormal vaginal bleeding  Breast    no breast discharge; no breast changes; no breast pain  Musculoskeletal    no myalgias; no  arthralgias; no back pain  Psychiatric           no depressed mood; no anxiety    Hematologic            no tender lymph nodes; no noticeable swellings or lumps   Endocrine    + hot flashes; no heat/cold intolerance         Neurological   no tremor; no numbness and tingling; no headaches; no difficulty sleeping    Obstetrics and Gynecology History:  ,  x 2  No HRT use      Past Medical History:  Past Medical History:   Diagnosis Date     Hypertension     no problems since gastric bypass Aug 2016       Past Surgical History:  Past Surgical History:   Procedure Laterality Date     COLONOSCOPY Left 2018    Procedure: COLONOSCOPY;  COLONOSCOPY ;  Surgeon: Austin Kahn MD;  Location:  GI     GASTRIC BYPASS      & hiatal hernia repair     HERNIA REPAIR      hiatal hernia repair with gastric bypass     LAPAROSCOPIC BYPASS GASTRIC, CHOLECYSTECTOMY, COMBINED N/A 2016    gallbladder NOT taken out-----still in     SLING TRANSPUBO WITHOUT ANTERIOR COLPORRHAPHY N/A 2017    Procedure: SLING TRANSPUBO WITHOUT ANTERIOR COLPORRHAPHY;  pubovaginal sling (altis);  Surgeon: Raad Rhoades MD;  Location:  OR       Health Maintenance:  Last Pap Smear: 16              Result: normal  She has not had a history of abnormal Pap smears.    Last Mammogram: 3/13/18              Result: microcalcifications and biopsy-Repeat 6 months      She has not had a history of abnormal mammograms.    Last Colonoscopy: 18              Result: negative-repeat 10 years      Current Medications:   has a current medication list which includes the following prescription(s): biotin, calcium citrate-vitamin d, cyanocobalamin, docusate sodium, iron-vitamin c, multivitamin  peds with iron, cholecalciferol, and drospirenone-ethinyl estradiol.     Allergies:   Sulfa drugs      Social History:  Social History     Social History     Marital status:      Spouse name: N/A     Number of children: N/A     Years of  "education: N/A     Occupational History     Not on file.     Social History Main Topics     Smoking status: Never Smoker     Smokeless tobacco: Never Used     Alcohol use Yes      Comment: once per month - none since Aug 2016     Drug use: No     Sexual activity: Yes     Partners: Male     Birth control/ protection: Male Surgical      Comment: ; 2 children (26 and 19)     Other Topics Concern     Parent/Sibling W/ Cabg, Mi Or Angioplasty Before 65f 55m? No     Social History Narrative       Lives with , feels safe at home.  Works as an .  Enjoys golfing, exercising.  Does not have an advanced directive on file and would like her  to be her POA.  Would like full resuscitation if reversible cause is identified, however would not like to be kept on life sustaining measures long-term.     Family History:   The patient's family history is significant for.  Family History   Problem Relation Age of Onset     CANCER Father      Hypertension Father      Lung Cancer Father      Chronic Obstructive Pulmonary Disease Mother      Hypertension Mother      Depression Mother      Anxiety Disorder Mother      Obesity Mother      Colon Cancer No family hx of          Physical Exam:   /82  Pulse 64  Temp 97.6  F (36.4  C) (Tympanic)  Resp 16  Ht 1.664 m (5' 5.5\")  Wt 77.3 kg (170 lb 6 oz)  SpO2 100%  BMI 27.92 kg/m2  Body mass index is 27.92 kg/(m^2).    General Appearance: healthy and alert, no distress     HEENT:  no thyromegaly, no palpable nodules or masses        Cardiovascular: regular rate and rhythm, no gallops, rubs or murmurs     Respiratory: lungs clear, no rales, rhonchi or wheezes, normal diaphragmatic excursion    Musculoskeletal: extremities non tender and without edema    Skin: no lesions or rashes     Neurological: normal gait, no gross defects     Psychiatric: appropriate mood and affect                               Hematological: normal cervical, supraclavicular and " inguinal lymph nodes     Gastrointestinal:       abdomen soft, non-tender, non-distended, no organomegaly or masses    Genitourinary: External genitalia and urethral meatus appears normal.  Vagina is smooth without nodularity or masses.  Cervix appears normal and without lesions.  Bimanual exam reveal no masses, nodularity or fullness.  Recto-vaginal exam confirms these findings.    Procedure Note:  Consent was obtained from the patient.  Speculum was placed and the cervix was grasped with a single toothed tenaculum.  An attempt to pass the Pipelle was unsuccessful at first and thus a sound was used to sound the uterus to 8 cm.  After this I was able to pass the Pipelle with a moderate amount of tissue obtained.  Tenaculum and speculum were removed and the cervix was hemostatic.Patient tolerated the procedure well.    Assessment:    Nicole Tinajero is a 53 year old woman with a new diagnosis of pelvic mass.     A total of 60 minutes was spent with the patient, >50% of which were spent in counseling the patient and/or treatment planning.      Plan:     1.)    Pelvic mass.  Etiologies including benign, malignant and borderline were discussed with the patient.  We discussed that while her  is elevated in a pre-menopausal woman there are many reasons why this could be elevated and that her cyst is otherwise very small and not concerning on imaging.  We discussed the rationale for not performing a biopsy of the ovary.  We discussed options of repeat imaging in 1-2 months vs immediate surgical resection and patient would prefer to proceed with repeat imaging.  She is moving to [facility] sometime this summer and thus we will plan to repeat imaging and  in 1 month.  If either they cyst of  are changing, we will plan to proceed with surgical resection at that time.  I will call her with the results of her repeat US and  when available.    2.) AUB.  We discussed possible etiologies including  polo-menopause but also hyperplasia or malignancy.  EMB performed today and I will call her with the results     3.) Genetic risk factors were assessed and the patient does not meet the qualifications for a referral unless malignancy is identified.      4.) Labs and/or tests ordered include:  EMB, US pelvis, .     5.) Health maintenance issues addressed today include pt is up to date.          Thank you for allowing us to participate in the care of your patient.         Sincerely,    Yuly Sahni MD  Gynecologic Oncology  [facility]       MISTY MATIAS

## 2018-05-22 ENCOUNTER — ONCOLOGY VISIT (OUTPATIENT)
Dept: ONCOLOGY | Facility: CLINIC | Age: 53
End: 2018-05-22
Attending: OBSTETRICS & GYNECOLOGY
Payer: COMMERCIAL

## 2018-05-22 ENCOUNTER — HOSPITAL ENCOUNTER (OUTPATIENT)
Facility: CLINIC | Age: 53
Setting detail: SPECIMEN
Discharge: HOME | End: 2018-05-22
Attending: OBSTETRICS & GYNECOLOGY | Admitting: OBSTETRICS & GYNECOLOGY
Payer: COMMERCIAL

## 2018-05-22 VITALS
BODY MASS INDEX: 27.38 KG/M2 | HEART RATE: 64 BPM | WEIGHT: 170.38 LBS | DIASTOLIC BLOOD PRESSURE: 82 MMHG | HEIGHT: 66 IN | SYSTOLIC BLOOD PRESSURE: 130 MMHG | RESPIRATION RATE: 16 BRPM | TEMPERATURE: 97.6 F | OXYGEN SATURATION: 100 %

## 2018-05-22 DIAGNOSIS — N93.9 ABNORMAL UTERINE BLEEDING (AUB): Primary | ICD-10-CM

## 2018-05-22 DIAGNOSIS — R19.00 PELVIC MASS: ICD-10-CM

## 2018-05-22 LAB — HCG UR QL: NEGATIVE

## 2018-05-22 PROCEDURE — 58100 BIOPSY OF UTERUS LINING: CPT | Performed by: OBSTETRICS & GYNECOLOGY

## 2018-05-22 PROCEDURE — 88305 TISSUE EXAM BY PATHOLOGIST: CPT | Performed by: OBSTETRICS & GYNECOLOGY

## 2018-05-22 PROCEDURE — 81025 URINE PREGNANCY TEST: CPT | Performed by: OBSTETRICS & GYNECOLOGY

## 2018-05-22 PROCEDURE — 99205 OFFICE O/P NEW HI 60 MIN: CPT | Mod: 25 | Performed by: OBSTETRICS & GYNECOLOGY

## 2018-05-22 PROCEDURE — 88305 TISSUE EXAM BY PATHOLOGIST: CPT | Mod: 26 | Performed by: OBSTETRICS & GYNECOLOGY

## 2018-05-22 PROCEDURE — G0463 HOSPITAL OUTPT CLINIC VISIT: HCPCS

## 2018-05-22 ASSESSMENT — PAIN SCALES - GENERAL: PAINLEVEL: NO PAIN (0)

## 2018-05-22 NOTE — MR AVS SNAPSHOT
After Visit Summary   5/22/2018    Nicole Tinajero    MRN: [id number]           Patient Information     Date Of Birth          1965        Visit Information        Provider Department      5/22/2018 10:00 AM Yuly Marie MD [facility]        Today's Diagnoses     Abnormal uterine bleeding (AUB)    -  1    Pelvic mass          Care Instructions    US pelvis and  in 1 month Scheduled  Kathleen HARRY given to patient            Follow-ups after your visit        Your next 10 appointments already scheduled     Jun 26, 2018  9:30 AM CDT   Return Visit with RH ONCOLOGY NURSE   [facility] ([facility])    [facility]  [address]  [facility] [address] [phone]            Jul 16, 2018 10:00 AM CDT   US PELVIC COMPLETE W TRANSVAGINAL with [id number]   [facility] ([facility])    [address]  [facility] [address] [phone]           Please bring a list of your medicines (including vitamins, minerals and over-the-counter drugs). Also, tell your doctor about any allergies you may have. Wear comfortable clothes and leave your valuables at home.  Adults: Drink six 8-ounce glasses of fluid one hour before your exam. Do NOT empty your bladder.  If you need to empty your bladder before your exam, try to release only a little bit of urine. Then, drink another 8oz glass of fluid.  Children: Children who are potty trained should drink at least 4 cups (32 oz) of liquid 45 minutes to one hour prior to the exam. The child s bladder must be full in order to achieve a diagnostic exam. If your child is very uncomfortable or has an urgent need to pee, please notify a technologist; they will try to find out how much longer the wait may be and provide instructions to help relieve the pressure. Occasionally it is medically necessary to  insert a urinary catheter to fill the bladder.  Please call the Imaging Department at your exam site with any questions.            Aug 17, 2018 10:00 AM CDT   Annual Visit with Ulises Sahu PA-C   [facility] ([facility])    [address]  [place] [address] [phone]            Aug 17, 2018 10:30 AM CDT   Annual Visit with Logan Connell Diet 2, RD   [facility] ([facility])    [address]  [place] [address] [phone]              Future tests that were ordered for you today     Open Future Orders        Priority Expected Expires Ordered     Routine 6/22/2018 5/22/2019 5/22/2018    US Pelvic Complete with Transvaginal Routine  5/22/2019 5/22/2018            Who to contact     If you have questions or need follow up information about today's clinic visit or your schedule please contact [facility] directly at [phone].  Normal or non-critical lab and imaging results will be communicated to you by Nimblehart, letter or phone within 4 business days after the clinic has received the results. If you do not hear from us within 7 days, please contact the clinic through [facility] or phone. If you have a critical or abnormal lab result, we will notify you by phone as soon as possible.  Submit refill requests through [facility] or call your pharmacy and they will forward the refill request to us. Please allow 3 business days for your refill to be completed.          Additional Information About Your Visit        [facility] Information     [facility] gives you secure access to your electronic health record. If you see a primary care provider, you can also send messages to your care team and make appointments. If you have questions, please call your primary care clinic.  If you do not have a primary care provider, please call  "[phone] and they will assist you.        Care EveryWhere ID     This is your Care EveryWhere ID. This could be used by other organizations to access your [facility] medical records  [id number]        Your Vitals Were     Pulse Temperature Respirations Height Pulse Oximetry BMI (Body Mass Index)    64 97.6  F (36.4  C) (Tympanic) 16 1.664 m (5' 5.5\") 100% 27.92 kg/m2       Blood Pressure from Last 3 Encounters:   05/22/18 130/82   04/20/18 107/75   04/16/18 120/72    Weight from Last 3 Encounters:   05/22/18 77.3 kg (170 lb 6 oz)   04/20/18 77.1 kg (170 lb)   04/16/18 79.4 kg (175 lb)              We Performed the Following     hCG qual urine POCT     HCG Qual, Urine - [facility],  [facility]  ([id number])     Surgical Path Exam        Primary Care Provider Office Phone # Fax #    June Narvaez [phone]       [address] [facility] [address]        Equal Access to Services     [facility]: Hadii aad ku hadasho Sojaniceali, waaxda luqadaha, qaybta kaalmada denilson, josué minaya . So [facility] [phone].    ATENCIÓN: Si habla español, tiene a amezquita disposición servicios gratuitos de asistencia lingüística. [facility] [phone].    We comply with applicable federal civil rights laws and [place] laws. We do not discriminate on the basis of race, color, national origin, age, disability, sex, sexual orientation, or gender identity.            Thank you!     Thank you for choosing [facility]  for your care. Our goal is always to provide you with excellent care. Hearing back from our patients is one way we can continue to improve our services. Please take a few minutes to complete the written survey that you may receive in the mail after your visit with us. Thank you!             Your Updated Medication List - Protect others around you: Learn how to safely use, store and throw away your medicines at www.disposemymeds.org.          This " list is accurate as of 5/22/18 12:21 PM.  Always use your most recent med list.                   Brand Name Dispense Instructions for use Diagnosis    BIOTIN PO      Take 10,000 mcg by mouth daily    Bariatric surgery status, Obesity (BMI 30.0-34.9)       CALCIUM CITRATE + D PO      Take 500 mg by mouth 2 times daily        CYANOCOBALAMIN SL      Place 1,000 mcg under the tongue every morning    Bariatric surgery status, Obesity (BMI 30-39.9)       docusate sodium 100 MG tablet    COLACE    60 tablet    Take 100 mg by mouth daily    Iron deficiency, Status post bariatric surgery, Malnutrition following gastrointestinal surgery       drospirenone-ethinyl estradiol 3-0.02 MG per tablet    PRASHANT    28 tablet    Take 1 tablet by mouth daily    Excessive or frequent menstruation       multivitamin  peds with iron 60 MG chewable tablet      Take 2 chew tab by mouth every morning    Obesity, Class III, BMI 40-49.9 (morbid obesity) (H)       VITAMIN D (CHOLECALCIFEROL) PO      Take 2,000 Units by mouth daily    Obesity, Class III, BMI 40-49.9 (morbid obesity) (H)       VITRON-C PO      Take 2 tablets by mouth every morning

## 2018-05-22 NOTE — NURSING NOTE
"Oncology Rooming Note    May 22, 2018 10:12 AM   Nicole Tinajero is a 53 year old female who presents for:    Chief Complaint   Patient presents with     Oncology Clinic Visit     New Consult     Initial Vitals: /82  Pulse 64  Temp 97.6  F (36.4  C) (Tympanic)  Resp 16  Ht 1.664 m (5' 5.5\")  Wt 77.3 kg (170 lb 6 oz)  SpO2 100%  BMI 27.92 kg/m2 Estimated body mass index is 27.92 kg/(m^2) as calculated from the following:    Height as of this encounter: 1.664 m (5' 5.5\").    Weight as of this encounter: 77.3 kg (170 lb 6 oz). Body surface area is 1.89 meters squared.  No Pain (0) Comment: Data Unavailable   No LMP recorded. Patient is not currently having periods (Reason: Premenopausal).  Allergies reviewed: Yes  Medications reviewed: Yes    Medications: Medication refills not needed today.  Pharmacy name entered into [employer]: [id number] [id number]  [address] [address]    Clinical concerns: New Patient     8 minutes for nursing intake (face to face time)     Ave Montesinos CMA     DISCHARGE PLAN:  Next appointments: See patient instruction section  Departure Mode: Ambulatory  Accompanied by:   0 minutes for nursing discharge (face to face time)   Ave Montesinos CMA                    "

## 2018-05-22 NOTE — LETTER
2018         RE: Nicole Tinajero  [address]  [facility] [address]        Dear Colleague,    Thank you for referring your patient, Nicole Tinajero, to the [facility]. Please see a copy of my visit note below.    Consult Notes on Referred Patient        Dr. Dante Serna MD  [address] [facility]  [address]  [place] [address]       RE: Nicole Tinajero  : 1965  PREET: 2018    Dear Dr. Dante Serna:    I had the pleasure of seeing your patient Nicole Tinajero here at the Gynecologic Cancer Clinic at the [facility] on 2018.  As you know she is a very pleasant 53 year old woman with a recent diagnosis of pelvic mass.  Given these findings she was subsequently sent to the Gynecologic Cancer Clinic for new patient consultation.  She is accompanied today by her .    She has been experiencing changes in her menstrual bleeding for the past year or so.  Her menses have become very irregular with irregular intervals and differences in flow.  She will at times have a light menses and then her next one will be very heavy.  She thus had an US which revealed a complex adnexal cyst.  She is having menopausal symptoms including hot flashes/night sweats.    3/21/18:  Uterus measures 11.3 x 8.6 x 6.9 cm with multiple 3-5 cm leiomyoma, EMS 4.4 mm, Right ovary is 3.5 x 4.3 x 3.2 cm and is normal, Left ovary is 4.9 x 4.2 x 3.7 cm with a 2.6 x 3.2 x 2.3 cm complex cyst    18:   = 43    Review of Systems:  Systemic           no weight changes; no fever; no chills; + night sweats; no appetite changes  Skin           no rashes, or lesions  Eye           no irritation; no changes in vision  Brinda-Laryngeal           no dysphagia; no hoarseness   Pulmonary    no cough; no shortness of breath  Cardiovascular    no chest pain; no palpitations  Gastrointestinal    no diarrhea; no constipation; no abdominal pain; no changes in bowel  habits; no blood in  stool  Genitourinary   no urinary frequency; no urinary urgency; no dysuria; no pain; no abnormal vaginal discharge; + abnormal vaginal bleeding  Breast    no breast discharge; no breast changes; no breast pain  Musculoskeletal    no myalgias; no arthralgias; no back pain  Psychiatric           no depressed mood; no anxiety    Hematologic            no tender lymph nodes; no noticeable swellings or lumps   Endocrine    + hot flashes; no heat/cold intolerance         Neurological   no tremor; no numbness and tingling; no headaches; no difficulty sleeping    Obstetrics and Gynecology History:  ,  x 2  No HRT use      Past Medical History:  Past Medical History:   Diagnosis Date     Hypertension     no problems since gastric bypass Aug 2016       Past Surgical History:  Past Surgical History:   Procedure Laterality Date     COLONOSCOPY Left 2018    Procedure: COLONOSCOPY;  COLONOSCOPY ;  Surgeon: Austin Kahn MD;  Location:  GI     GASTRIC BYPASS      & hiatal hernia repair     HERNIA REPAIR      hiatal hernia repair with gastric bypass     LAPAROSCOPIC BYPASS GASTRIC, CHOLECYSTECTOMY, COMBINED N/A 2016    gallbladder NOT taken out-----still in     SLING TRANSPUBO WITHOUT ANTERIOR COLPORRHAPHY N/A 2017    Procedure: SLING TRANSPUBO WITHOUT ANTERIOR COLPORRHAPHY;  pubovaginal sling (altis);  Surgeon: Raad Rhoades MD;  Location:  OR       Health Maintenance:  Last Pap Smear: 16              Result: normal  She has not had a history of abnormal Pap smears.    Last Mammogram: 3/13/18              Result: microcalcifications and biopsy-Repeat 6 months      She has not had a history of abnormal mammograms.    Last Colonoscopy: 18              Result: negative-repeat 10 years      Current Medications:   has a current medication list which includes the following prescription(s): biotin, calcium citrate-vitamin d, cyanocobalamin, docusate sodium, iron-vitamin c, multivitamin  " peds with iron, cholecalciferol, and drospirenone-ethinyl estradiol.     Allergies:   Sulfa drugs      Social History:  Social History     Social History     Marital status:      Spouse name: N/A     Number of children: N/A     Years of education: N/A     Occupational History     Not on file.     Social History Main Topics     Smoking status: Never Smoker     Smokeless tobacco: Never Used     Alcohol use Yes      Comment: once per month - none since Aug 2016     Drug use: No     Sexual activity: Yes     Partners: Male     Birth control/ protection: Male Surgical      Comment: ; 2 children (26 and 19)     Other Topics Concern     Parent/Sibling W/ Cabg, Mi Or Angioplasty Before 65f 55m? No     Social History Narrative       Lives with , feels safe at home.  Works as an .  Enjoys golfing, exercising.  Does not have an advanced directive on file and would like her  to be her POA.  Would like full resuscitation if reversible cause is identified, however would not like to be kept on life sustaining measures long-term.     Family History:   The patient's family history is significant for.  Family History   Problem Relation Age of Onset     CANCER Father      Hypertension Father      Lung Cancer Father      Chronic Obstructive Pulmonary Disease Mother      Hypertension Mother      Depression Mother      Anxiety Disorder Mother      Obesity Mother      Colon Cancer No family hx of          Physical Exam:   /82  Pulse 64  Temp 97.6  F (36.4  C) (Tympanic)  Resp 16  Ht 1.664 m (5' 5.5\")  Wt 77.3 kg (170 lb 6 oz)  SpO2 100%  BMI 27.92 kg/m2  Body mass index is 27.92 kg/(m^2).    General Appearance: healthy and alert, no distress     HEENT:  no thyromegaly, no palpable nodules or masses        Cardiovascular: regular rate and rhythm, no gallops, rubs or murmurs     Respiratory: lungs clear, no rales, rhonchi or wheezes, normal diaphragmatic " excursion    Musculoskeletal: extremities non tender and without edema    Skin: no lesions or rashes     Neurological: normal gait, no gross defects     Psychiatric: appropriate mood and affect                               Hematological: normal cervical, supraclavicular and inguinal lymph nodes     Gastrointestinal:       abdomen soft, non-tender, non-distended, no organomegaly or masses    Genitourinary: External genitalia and urethral meatus appears normal.  Vagina is smooth without nodularity or masses.  Cervix appears normal and without lesions.  Bimanual exam reveal no masses, nodularity or fullness.  Recto-vaginal exam confirms these findings.    Procedure Note:  Consent was obtained from the patient.  Speculum was placed and the cervix was grasped with a single toothed tenaculum.  An attempt to pass the Pipelle was unsuccessful at first and thus a sound was used to sound the uterus to 8 cm.  After this I was able to pass the Pipelle with a moderate amount of tissue obtained.  Tenaculum and speculum were removed and the cervix was hemostatic.Patient tolerated the procedure well.    Assessment:    Nicole Tinajero is a 53 year old woman with a new diagnosis of pelvic mass.     A total of 60 minutes was spent with the patient, >50% of which were spent in counseling the patient and/or treatment planning.      Plan:     1.)    Pelvic mass.  Etiologies including benign, malignant and borderline were discussed with the patient.  We discussed that while her  is elevated in a pre-menopausal woman there are many reasons why this could be elevated and that her cyst is otherwise very small and not concerning on imaging.  We discussed the rationale for not performing a biopsy of the ovary.  We discussed options of repeat imaging in 1-2 months vs immediate surgical resection and patient would prefer to proceed with repeat imaging.  She is moving to [facility] sometime this summer and thus we will plan to  repeat imaging and  in 1 month.  If either they cyst of  are changing, we will plan to proceed with surgical resection at that time.  I will call her with the results of her repeat US and  when available.    2.) AUB.  We discussed possible etiologies including polo-menopause but also hyperplasia or malignancy.  EMB performed today and I will call her with the results     3.) Genetic risk factors were assessed and the patient does not meet the qualifications for a referral unless malignancy is identified.      4.) Labs and/or tests ordered include:  EMB, US pelvis, .     5.) Health maintenance issues addressed today include pt is up to date.          Thank you for allowing us to participate in the care of your patient.         Sincerely,    Yuly Sahni MD  Gynecologic Oncology  [facility]       MISTY MATIAS         Again, thank you for allowing me to participate in the care of your patient.        Sincerely,        Santino Sahni MD

## 2018-05-22 NOTE — NURSING NOTE
GYN/ONC Procedures:  Procedures: Endometrial Biopsy  Consent:  Informed Consent Deferred to Surgeon  Pain pre-procedure:  0    Time out:  Prior to the start of the procedure and with procedural staff participation the following steps were followed. Using two patient identifiers confirmation of right patient, right allergies, right procedure, right consent and verification of right equipement and supplies were verbally discussed.  Pain post-procedure:  1  Discharge:  Post instructions were given to patient.  Patient verbalized understanding.

## 2018-05-23 LAB — COPATH REPORT: NORMAL

## 2018-06-05 ENCOUNTER — RADIANT APPOINTMENT (OUTPATIENT)
Dept: GENERAL RADIOLOGY | Facility: CLINIC | Age: 53
End: 2018-06-05
Attending: FAMILY MEDICINE
Payer: COMMERCIAL

## 2018-06-05 ENCOUNTER — OFFICE VISIT (OUTPATIENT)
Dept: URGENT CARE | Facility: URGENT CARE | Age: 53
End: 2018-06-05
Payer: COMMERCIAL

## 2018-06-05 VITALS
HEART RATE: 80 BPM | BODY MASS INDEX: 28.79 KG/M2 | SYSTOLIC BLOOD PRESSURE: 112 MMHG | DIASTOLIC BLOOD PRESSURE: 66 MMHG | TEMPERATURE: 98.4 F | HEIGHT: 65 IN | WEIGHT: 172.8 LBS | OXYGEN SATURATION: 98 %

## 2018-06-05 DIAGNOSIS — S62.91XA FRACTURE OF RIGHT HAND, CLOSED, INITIAL ENCOUNTER: Primary | ICD-10-CM

## 2018-06-05 DIAGNOSIS — S69.91XA HAND INJURY, RIGHT, INITIAL ENCOUNTER: ICD-10-CM

## 2018-06-05 DIAGNOSIS — S62.616A CLOSED DISPLACED FRACTURE OF PROXIMAL PHALANX OF RIGHT LITTLE FINGER, INITIAL ENCOUNTER: ICD-10-CM

## 2018-06-05 PROCEDURE — 99214 OFFICE O/P EST MOD 30 MIN: CPT | Mod: 25 | Performed by: FAMILY MEDICINE

## 2018-06-05 PROCEDURE — 29125 APPL SHORT ARM SPLINT STATIC: CPT | Performed by: FAMILY MEDICINE

## 2018-06-05 PROCEDURE — 73130 X-RAY EXAM OF HAND: CPT | Mod: RT

## 2018-06-05 NOTE — PROGRESS NOTES
SUBJECTIVE:  Chief Complaint   Patient presents with     Hand Injury     Nicole Tinajero is a 53 year old female presents with a chief complaint of right hand pain, swelling, tenderness and decreased range of motion.  The injury occurred 4 day(s) ago (Friday 6/1 around 11pm)   The injury happened while at home. How: was playing and goofing off with  while cleaning house, hand was grabbed hand and ended up injuring it, did not come in as patient had open house the following day and then flew out of town on Saturday afternoon, just return back today.  The patient complained of moderate pain, bruising, swelling and has had decreased ROM.  Pain exacerbated by movement.  Relieved by rest and ice, swelling and bruising has improved since initial injury.  She treated it initially with ice and Tylenol. This is the first time this type of injury has occurred to this patient.  Patient is right handed.    Past Medical History:   Diagnosis Date     Hypertension     no problems since gastric bypass Aug 2016     Current Outpatient Prescriptions   Medication Sig Dispense Refill     BIOTIN PO Take 10,000 mcg by mouth daily       Calcium Citrate-Vitamin D (CALCIUM CITRATE + D PO) Take 500 mg by mouth 2 times daily        CYANOCOBALAMIN SL Place 1,000 mcg under the tongue every morning        docusate sodium (COLACE) 100 MG tablet Take 100 mg by mouth daily 60 tablet 1     drospirenone-ethinyl estradiol (PRASHANT) 3-0.02 MG per tablet Take 1 tablet by mouth daily 28 tablet 3     Iron-Vitamin C (VITRON-C PO) Take 2 tablets by mouth every morning        multivitamin  peds with iron (FLINTSTONES COMPLETE) 60 MG chewable tablet Take 2 chew tab by mouth every morning        VITAMIN D, CHOLECALCIFEROL, PO Take 2,000 Units by mouth daily       Social History   Substance Use Topics     Smoking status: Never Smoker     Smokeless tobacco: Never Used     Alcohol use Yes      Comment: once per month - none since Aug 2016       ROS:  Review of  "systems negative except as stated above.    EXAM:   /66 (BP Location: Right arm, Cuff Size: Adult Regular)  Pulse 80  Temp 98.4  F (36.9  C) (Tympanic)  Ht 5' 5\" (1.651 m)  Wt 172 lb 12.8 oz (78.4 kg)  SpO2 98%  BMI 28.76 kg/m2  Gen: healthy,alert,no distress  Extremity: right hand has swelling, moderate bruising dorsum and palmar aspect, decreased ROM.   There is not compromise to the distal circulation.  Pulses are +2 and CRT is brisk  EXTREMITIES: peripheral pulses normal    X-RAY was done - right hand- fracture of 5th metacarpal, fracture of 5th finger - proximal phalanx    ASSESSMENT/PLAN:   (S62.91XA) Fracture of right hand, closed, initial encounter  (primary encounter diagnosis)  Plan: ORTHO  REFERRAL, APPLY SHORT ARM         SPLINT STATIC            (S69.91XA) Hand injury, right, initial encounter  Comment: fracture  Plan: XR Hand Right G/E 3 Views            (S62.616A) Closed displaced fracture of proximal phalanx of right little finger, initial encounter  Plan: in short arm splint, gideon tape      Reviewed with patient that has fracture 5th finger and 5th metacarpal.  Short gutter arm orthoglass splint applied, sling for comfort.  Referral to [facility] for fracture care.  Reviewed symptoms treatment with tylenol, rest, ice, elevation.  Okay to gideon tape fingers if not in cast.    Follow up with [facility].    Mane Lew MD  June 5, 2018 6:21 PM              "

## 2018-06-05 NOTE — PATIENT INSTRUCTIONS
"Okay for tylenol for discomfort.  Follow up with [facility] for fracture care.    Ice, elevation, keep in splint and sling for comfort.          Treating Hand Fractures  A fractured bone starts to heal on its own right away. But a treatment called reduction may help the break heal correctly. Reduction is a process that repositions or \"sets\" the fracture. The goal is to get the broken bone ends as close as possible to how they were before the injury. Your healthcare provider will use one or more methods of reduction.     A splint and cast both limit movement. They keep your finger or hand in the best place for healing.   Closed reduction  If you have a clean break with little soft tissue damage, closed reduction will likely be used. Before the procedure, you may be given medicine to numb the area and relax your muscles. Then your healthcare provider manually readjusts the broken bone. A splint or a cast will be worn while you heal.     A pin, screw, or plate with screws helps keep the bone stable and in place as it heals.     Open reduction  If you have an open fracture (bone sticking out through the skin), badly misaligned sections of bone, or severe tissue injury, open reduction may be needed. A general anesthetic may be used during the surgery to let you sleep and relax your muscles. Your healthcare provider then makes one or more cuts (incisions) to realign the bone and fix soft tissues. Pins, screws, plates, or a combination may be used to hold the bone in place during healing.  The road to healing  Fractures may take from 4 weeks to 4 months to heal. It depends on the bone and the severity of your injury. Keeping your hand raised can help control swelling, throbbing, and pain. Your healthcare provider may give you medicine to help ease pain. Don t remove a splint or cast unless your healthcare provider says you can. Call your healthcare provider if your pain gets worse or if you notice lots of swelling or redness. "   Date Last Reviewed: 1/1/2018 2000-2017 [employer]. [address] [place] [id number]. All rights reserved. This information is not intended as a substitute for professional medical care. Always follow your healthcare professional's instructions.        Finger or Toe Fractures (Broken Finger or Toe)  A hard blow can break a bone in your toe or finger. Broken bones are also known as fractures. Even minor fractures need medical care. Without treatment, they may heal crooked, remain stiff, or develop other problems.    When to go to the Emergency Room (ER)  You may not always know when you have a fractured toe or finger. Apply ice to the injury right away. Then, seek medical care if:    Your finger or toe is swollen or very painful.    You cannot move your finger or toe normally.    Your injured toe or finger is pale or blue.    You are bleeding.    A bone protrudes through your skin.  What to expect in the ER  A healthcare provider will ask about your injury and examine your toe or finger. You may have X-rays. Treatment will depend on the type of fracture you have.  Toe fracture  Your healthcare provider may straighten a broken toe. You'll be given local anesthesia so you won't feel any discomfort during this procedure. Your injured toe may then be splinted by being taped to a toe next to it, or placed on a pad that's taped to your foot. Your healthcare provider may also ask you to apply ice and keep your foot elevated.  Finger fracture  Your healthcare provider may straighten a broken finger. A broken finger is likely to be placed in a metal splint. This helps straighten and protect the finger while it heals. Your healthcare provider may give you exercises to do as your injury heals, to prevent stiffness in your finger.  Date Last Reviewed: 9/28/2015 2000-2017 [employer]. [address] [place] [address]. All rights reserved. This information is not intended as a  substitute for professional medical care. Always follow your healthcare professional's instructions.

## 2018-06-05 NOTE — MR AVS SNAPSHOT
"              After Visit Summary   6/5/2018    Nicole Tinajero    MRN: [id number]           Patient Information     Date Of Birth          1965        Visit Information        Provider Department      6/5/2018 5:20 PM Mane Lew MD [facility]        Today's Diagnoses     Fracture of right hand, closed, initial encounter    -  1    Hand injury, right, initial encounter          Care Instructions    Okay for tylenol for discomfort.  Follow up with [facility] for fracture care.    Ice, elevation, keep in splint and sling for comfort.          Treating Hand Fractures  A fractured bone starts to heal on its own right away. But a treatment called reduction may help the break heal correctly. Reduction is a process that repositions or \"sets\" the fracture. The goal is to get the broken bone ends as close as possible to how they were before the injury. Your healthcare provider will use one or more methods of reduction.     A splint and cast both limit movement. They keep your finger or hand in the best place for healing.   Closed reduction  If you have a clean break with little soft tissue damage, closed reduction will likely be used. Before the procedure, you may be given medicine to numb the area and relax your muscles. Then your healthcare provider manually readjusts the broken bone. A splint or a cast will be worn while you heal.     A pin, screw, or plate with screws helps keep the bone stable and in place as it heals.     Open reduction  If you have an open fracture (bone sticking out through the skin), badly misaligned sections of bone, or severe tissue injury, open reduction may be needed. A general anesthetic may be used during the surgery to let you sleep and relax your muscles. Your healthcare provider then makes one or more cuts (incisions) to realign the bone and fix soft tissues. Pins, screws, plates, or a combination may be used to hold the bone in place during healing.  The road to " healing  Fractures may take from 4 weeks to 4 months to heal. It depends on the bone and the severity of your injury. Keeping your hand raised can help control swelling, throbbing, and pain. Your healthcare provider may give you medicine to help ease pain. Don t remove a splint or cast unless your healthcare provider says you can. Call your healthcare provider if your pain gets worse or if you notice lots of swelling or redness.   Date Last Reviewed: 1/1/2018 2000-2017 [employer]. [address] [place] [address]. All rights reserved. This information is not intended as a substitute for professional medical care. Always follow your healthcare professional's instructions.        Finger or Toe Fractures (Broken Finger or Toe)  A hard blow can break a bone in your toe or finger. Broken bones are also known as fractures. Even minor fractures need medical care. Without treatment, they may heal crooked, remain stiff, or develop other problems.    When to go to the Emergency Room (ER)  You may not always know when you have a fractured toe or finger. Apply ice to the injury right away. Then, seek medical care if:    Your finger or toe is swollen or very painful.    You cannot move your finger or toe normally.    Your injured toe or finger is pale or blue.    You are bleeding.    A bone protrudes through your skin.  What to expect in the ER  A healthcare provider will ask about your injury and examine your toe or finger. You may have X-rays. Treatment will depend on the type of fracture you have.  Toe fracture  Your healthcare provider may straighten a broken toe. You'll be given local anesthesia so you won't feel any discomfort during this procedure. Your injured toe may then be splinted by being taped to a toe next to it, or placed on a pad that's taped to your foot. Your healthcare provider may also ask you to apply ice and keep your foot elevated.  Finger fracture  Your healthcare provider may  straighten a broken finger. A broken finger is likely to be placed in a metal splint. This helps straighten and protect the finger while it heals. Your healthcare provider may give you exercises to do as your injury heals, to prevent stiffness in your finger.  Date Last Reviewed: 9/28/2015 2000-2017 [employer]. [address] [place] [address], [place] [address]. All rights reserved. This information is not intended as a substitute for professional medical care. Always follow your healthcare professional's instructions.                Follow-ups after your visit        Additional Services     ORTHO  REFERRAL       [facility] is referring you to the Orthopedic  Services at [facility].       The  Representative will assist you in the coordination of your Orthopedic and Musculoskeletal Care as prescribed by your physician.    The  Representative will call you within 1 business day to help schedule your appointment, or you may contact the  Representative at:    All areas ~ [phone]     Type of Referral : Non Surgical       Timeframe requested: 1 - 2 days    Coverage of these services is subject to the terms and limitations of your health insurance plan.  Please call member services at your health plan with any benefit or coverage questions.      If X-rays, CT or MRI's have been performed, please contact the facility where they were done to arrange for , prior to your scheduled appointment.  Please bring this referral request to your appointment and present it to your specialist.                  Your next 10 appointments already scheduled     Jun 26, 2018  9:30 AM CDT   Return Visit with  ONCOLOGY NURSE   [facility] ([facility])    [facility]  [address]  [facility] [address]   [phone]            Jun 26, 2018 10:00 AM CDT   US  PELVIC COMPLETE W TRANSVAGINAL with [email or web address]   [facility] ([facility])    [address]  [facility] [address] [phone]           Please bring a list of your medicines (including vitamins, minerals and over-the-counter drugs). Also, tell your doctor about any allergies you may have. Wear comfortable clothes and leave your valuables at home.  Adults: Drink six 8-ounce glasses of fluid one hour before your exam. Do NOT empty your bladder.  If you need to empty your bladder before your exam, try to release only a little bit of urine. Then, drink another 8oz glass of fluid.  Children: Children who are potty trained should drink at least 4 cups (32 oz) of liquid 45 minutes to one hour prior to the exam. The child s bladder must be full in order to achieve a diagnostic exam. If your child is very uncomfortable or has an urgent need to pee, please notify a technologist; they will try to find out how much longer the wait may be and provide instructions to help relieve the pressure. Occasionally it is medically necessary to insert a urinary catheter to fill the bladder.  Please call the Imaging Department at your exam site with any questions.            Aug 17, 2018 10:00 AM CDT   Annual Visit with Ulises Sahu PA-C   [facility] ([facility])    [address] [place] [id number] [phone]            Aug 17, 2018 10:30 AM CDT   Annual Visit with Logan Ross 2, RD   [facility] ([facility])    [address] [place] [id number] [phone]              Who to contact     If you have questions or need follow up information about today's clinic visit or your schedule please contact [facility] directly at [phone].  Normal or non-critical lab and imaging results  "will be communicated to you by MyChart, letter or phone within 4 business days after the clinic has received the results. If you do not hear from us within 7 days, please contact the clinic through [employer] or phone. If you have a critical or abnormal lab result, we will notify you by phone as soon as possible.  Submit refill requests through [employer] or call your pharmacy and they will forward the refill request to us. Please allow 3 business days for your refill to be completed.          Additional Information About Your Visit        [employer] Information     [employer] gives you secure access to your electronic health record. If you see a primary care provider, you can also send messages to your care team and make appointments. If you have questions, please call your primary care clinic.  If you do not have a primary care provider, please call [phone] and they will assist you.        Care EveryWhere ID     This is your Care EveryWhere ID. This could be used by other organizations to access your [facility] medical records  [id number]        Your Vitals Were     Pulse Temperature Height Pulse Oximetry BMI (Body Mass Index)       80 98.4  F (36.9  C) (Tympanic) 5' 5\" (1.651 m) 98% 28.76 kg/m2        Blood Pressure from Last 3 Encounters:   06/05/18 112/66   05/22/18 130/82   04/20/18 107/75    Weight from Last 3 Encounters:   06/05/18 172 lb 12.8 oz (78.4 kg)   05/22/18 170 lb 6 oz (77.3 kg)   04/20/18 170 lb (77.1 kg)              We Performed the Following     ORTHO  REFERRAL        Primary Care Provider Office Phone # Fax #    June Narvaez [phone]       [address] [facility] [address]        Equal Access to Services     [facility] Center[facility]ATYANA : Hadii almas Saha, waadamda aditi, qaybta kaalmada denilson, josué gomez. So [facility] [phone].    ATENCIÓN: Si habla español, tiene a amezquita disposición servicios gratuitos de asistencia " lingüísticaCornel Rosales al [phone].    We comply with applicable federal civil rights laws and [place] laws. We do not discriminate on the basis of race, color, national origin, age, disability, sex, sexual orientation, or gender identity.            Thank you!     Thank you for choosing [facility]  for your care. Our goal is always to provide you with excellent care. Hearing back from our patients is one way we can continue to improve our services. Please take a few minutes to complete the written survey that you may receive in the mail after your visit with us. Thank you!             Your Updated Medication List - Protect others around you: Learn how to safely use, store and throw away your medicines at www.disposemymeds.org.          This list is accurate as of 6/5/18  6:09 PM.  Always use your most recent med list.                   Brand Name Dispense Instructions for use Diagnosis    BIOTIN PO      Take 10,000 mcg by mouth daily    Bariatric surgery status, Obesity (BMI 30.0-34.9)       CALCIUM CITRATE + D PO      Take 500 mg by mouth 2 times daily        CYANOCOBALAMIN SL      Place 1,000 mcg under the tongue every morning    Bariatric surgery status, Obesity (BMI 30-39.9)       docusate sodium 100 MG tablet    COLACE    60 tablet    Take 100 mg by mouth daily    Iron deficiency, Status post bariatric surgery, Malnutrition following gastrointestinal surgery       drospirenone-ethinyl estradiol 3-0.02 MG per tablet    PRASHANT    28 tablet    Take 1 tablet by mouth daily    Excessive or frequent menstruation       multivitamin  peds with iron 60 MG chewable tablet      Take 2 chew tab by mouth every morning    Obesity, Class III, BMI 40-49.9 (morbid obesity) (H)       VITAMIN D (CHOLECALCIFEROL) PO      Take 2,000 Units by mouth daily    Obesity, Class III, BMI 40-49.9 (morbid obesity) (H)       VITRON-C PO      Take 2 tablets by mouth every morning

## 2018-06-07 ENCOUNTER — OFFICE VISIT (OUTPATIENT)
Dept: ORTHOPEDICS | Facility: CLINIC | Age: 53
End: 2018-06-07
Payer: COMMERCIAL

## 2018-06-07 VITALS
SYSTOLIC BLOOD PRESSURE: 102 MMHG | BODY MASS INDEX: 27.32 KG/M2 | DIASTOLIC BLOOD PRESSURE: 70 MMHG | HEIGHT: 66 IN | WEIGHT: 170 LBS

## 2018-06-07 DIAGNOSIS — S62.336A CLOSED DISPLACED FRACTURE OF NECK OF FIFTH METACARPAL BONE OF RIGHT HAND, INITIAL ENCOUNTER: Primary | ICD-10-CM

## 2018-06-07 PROCEDURE — 26600 TREAT METACARPAL FRACTURE: CPT | Performed by: ORTHOPAEDIC SURGERY

## 2018-06-07 NOTE — PROGRESS NOTES
HISTORY OF PRESENT ILLNESS:    Nicole Tinajero is a 53 year old female who is seen in consultation at the request of Dr. Lew for Right hand injury that occurred on 6/1/2018.  Patient states that she was playing around with  and her arms were wrapped around her and being held by  and then she was working to release and injured hand.  Iced pack after and then went through weekend on a trip. Hand was swollen and discolored.  Kept hand ice and elevated as much as she could. UC on Tuesday 6/5/2018 x-rays done and placed in a splint and sling.  Patient does note the swelling has decreased since initial injury.     Present symptoms: discomfort of the splint.  Been in splint for last 36 hours.    Treatments tried to this point: tylenol as needed  Orthopedic PMH: none     Past Medical History:   Diagnosis Date     Hypertension     no problems since gastric bypass Aug 2016       Past Surgical History:   Procedure Laterality Date     COLONOSCOPY Left 4/20/2018    Procedure: COLONOSCOPY;  COLONOSCOPY ;  Surgeon: Austin Kahn MD;  Location: [facility]     GASTRIC BYPASS      & hiatal hernia repair     HERNIA REPAIR      hiatal hernia repair with gastric bypass     LAPAROSCOPIC BYPASS GASTRIC, CHOLECYSTECTOMY, COMBINED N/A 8/17/2016    gallbladder NOT taken out-----still in     SLING TRANSPUBO WITHOUT ANTERIOR COLPORRHAPHY N/A 6/19/2017    Procedure: SLING TRANSPUBO WITHOUT ANTERIOR COLPORRHAPHY;  pubovaginal sling (altis);  Surgeon: Raad Rhoades MD;  Location:  OR       Family History   Problem Relation Age of Onset     CANCER Father      Hypertension Father      Lung Cancer Father      Chronic Obstructive Pulmonary Disease Mother      Hypertension Mother      Depression Mother      Anxiety Disorder Mother      Obesity Mother      Colon Cancer No family hx of        Social History     Social History     Marital status:      Spouse name: N/A     Number of children: N/A     Years of education:  N/A     Occupational History     Not on file.     Social History Main Topics     Smoking status: Never Smoker     Smokeless tobacco: Never Used     Alcohol use Yes      Comment: once per month - none since Aug 2016     Drug use: No     Sexual activity: Yes     Partners: Male     Birth control/ protection: Male Surgical      Comment: ; 2 children (26 and 19)     Other Topics Concern     Parent/Sibling W/ Cabg, Mi Or Angioplasty Before 65f 55m? No     Social History Narrative       Current Outpatient Prescriptions   Medication Sig Dispense Refill     BIOTIN PO Take 10,000 mcg by mouth daily       Calcium Citrate-Vitamin D (CALCIUM CITRATE + D PO) Take 500 mg by mouth 2 times daily        CYANOCOBALAMIN SL Place 1,000 mcg under the tongue every morning        docusate sodium (COLACE) 100 MG tablet Take 100 mg by mouth daily 60 tablet 1     drospirenone-ethinyl estradiol (PRASHANT) 3-0.02 MG per tablet Take 1 tablet by mouth daily 28 tablet 3     Iron-Vitamin C (VITRON-C PO) Take 2 tablets by mouth every morning        multivitamin  peds with iron (FLINTSTONES COMPLETE) 60 MG chewable tablet Take 2 chew tab by mouth every morning        VITAMIN D, CHOLECALCIFEROL, PO Take 2,000 Units by mouth daily         Allergies   Allergen Reactions     Sulfa Drugs Hives     Rash/hives         REVIEW OF SYSTEMS:  CONSTITUTIONAL:  NEGATIVE for fever, chills, change in weight  INTEGUMENTARY/SKIN:  NEGATIVE for worrisome rashes, moles or lesions  EYES:  NEGATIVE for vision changes or irritation  ENT/MOUTH:  NEGATIVE for ear, mouth and throat problems  RESP:  NEGATIVE for significant cough or SOB  BREAST:  NEGATIVE for masses, tenderness or discharge  CV:  NEGATIVE for chest pain, palpitations or peripheral edema, positive for high blood pressure  GI:  NEGATIVE for nausea, abdominal pain, change in bowel habits, positive for heartburn/ reflux  :  Negative   MUSCULOSKELETAL:  See HPI above  NEURO:  NEGATIVE for weakness, dizziness or  "paresthesias  ENDOCRINE:  NEGATIVE for temperature intolerance, skin/hair changes  HEME/ALLERGY/IMMUNE:  NEGATIVE for bleeding problems  PSYCHIATRIC:  NEGATIVE for changes in mood or affect      PHYSICAL EXAM:  /70  Ht 5' 6\" (1.676 m)  Wt 170 lb (77.1 kg)  BMI 27.44 kg/m2  Body mass index is 27.44 kg/(m^2).   GENERAL APPEARANCE: healthy, alert and no distress   HEENT: No apparent thyroid megaly. Clear sclera with normal ocular movement  RESPIRATORY: No labored breathing  SKIN: no suspicious lesions or rashes  NEURO: Normal strength and tone, mentation intact and speech normal  VASCULAR: Good pulses, and capillary refill   LYMPH: no lymphadenopathy   PSYCH:  mentation appears normal and affect normal/bright    MUSCULOSKELETAL:  Slight swelling in the fingers and hand, right  Felt much better once the splint was released  The Ace wrap was felt to be a little bit too tight.  Sensation is intact  Focal tenderness at the fifth metacarpal  Skin is intact  Ulnar styloid is also not erythematous      ASSESSMENT:  Distal fifth metacarpal fracture, right with mild intra-articular depression at the fracture site    PLAN:  We visualized x-rays from June 5, 2018.  Findings are thoroughly explained.  Even though the fracture involves intra-articular space and there is a very minimal step-off, nonoperative treatments felt to be reasonable.  We recommended continuation of the ulnar gutter splint that she has  We modified it so that she can use her index and long fingers.  We will see her back in 3 weeks with new x-rays out of the splint.  She can take it off quickly for shower.  All the questions were answered.        Imaging Interpretation:   None today      Jonah Rich MD  Department of Orthopedic Surgery        Disclaimer: This note consists of symbols derived from keyboarding, dictation and/or voice recognition software. As a result, there may be errors in the script that have gone undetected. Please consider this when " interpreting information found in this chart.

## 2018-06-07 NOTE — MR AVS SNAPSHOT
After Visit Summary   6/7/2018    Nicole Tinajero    MRN: [id number]           Patient Information     Date Of Birth          1965        Visit Information        Provider Department      6/7/2018 9:40 AM Jonah Rich MD [facility] [place] [facility]        Today's Diagnoses     Closed displaced fracture of neck of fifth metacarpal bone of right hand, initial encounter    -  1      Care Instructions    Follow-up in 3 weeks for new x-rays          Follow-ups after your visit        Your next 10 appointments already scheduled     Jun 26, 2018  9:30 AM CDT   Return Visit with  ONCOLOGY NURSE   [facility] ([facility])    [facility]  [address]  [facility] [address] [phone]            Jun 26, 2018 10:00 AM CDT   US PELVIC COMPLETE W TRANSVAGINAL with RSCCUS1   [facility] [place] ([facility])    [address]  [facility] [address] [phone]           Please bring a list of your medicines (including vitamins, minerals and over-the-counter drugs). Also, tell your doctor about any allergies you may have. Wear comfortable clothes and leave your valuables at home.  Adults: Drink six 8-ounce glasses of fluid one hour before your exam. Do NOT empty your bladder.  If you need to empty your bladder before your exam, try to release only a little bit of urine. Then, drink another 8oz glass of fluid.  Children: Children who are potty trained should drink at least 4 cups (32 oz) of liquid 45 minutes to one hour prior to the exam. The child s bladder must be full in order to achieve a diagnostic exam. If your child is very uncomfortable or has an urgent need to pee, please notify a technologist; they will try to find out how much longer the wait may be and provide instructions to help relieve the pressure. Occasionally it is medically necessary to insert a  urinary catheter to fill the bladder.  Please call the Imaging Department at your exam site with any questions.            Jul 02, 2018  3:20 PM CDT   Return Visit with Jonah Rich MD   [facility] ([facility])    [address]  [address]  [facility] [address]   [phone]            Aug 17, 2018 10:00 AM CDT   Annual Visit with Ulises Sahu PA-C   [facility] ([facility])    [address] [place] [address] [phone]            Aug 17, 2018 10:30 AM CDT   Annual Visit with Logan Connell Diet 2, RD   [facility] ([facility])    [address]  [facility] [address] [phone]              Who to contact     If you have questions or need follow up information about today's clinic visit or your schedule please contact [facility] directly at [phone].  Normal or non-critical lab and imaging results will be communicated to you by [employer], letter or phone within 4 business days after the clinic has received the results. If you do not hear from us within 7 days, please contact the clinic through [employer] or phone. If you have a critical or abnormal lab result, we will notify you by phone as soon as possible.  Submit refill requests through [employer] or call your pharmacy and they will forward the refill request to us. Please allow 3 business days for your refill to be completed.          Additional Information About Your Visit        [employer] Information     [employer] gives you secure access to your electronic health record. If you see a primary care provider, you can also send messages to your care team and make appointments. If you have questions, please call your primary care clinic.  If you do not have a primary care provider, please call [phone] and they will  "assist you.        Care EveryWhere ID     This is your Care EveryWhere ID. This could be used by other organizations to access your [place] medical records  [id number]        Your Vitals Were     Height BMI (Body Mass Index)                5' 6\" (1.676 m) 27.44 kg/m2           Blood Pressure from Last 3 Encounters:   06/07/18 102/70   06/05/18 112/66   05/22/18 130/82    Weight from Last 3 Encounters:   06/07/18 170 lb (77.1 kg)   06/05/18 172 lb 12.8 oz (78.4 kg)   05/22/18 170 lb 6 oz (77.3 kg)              Today, you had the following     No orders found for display       Primary Care Provider Office Phone # Fax #    June Narvaez [phone]       [address] [facility] [address]        Equal Access to Services     [facility]: Hadii aad ku hadasho Soomaali, waaxda luqadaha, qaybta kaalmada adeegyada, waxay preetiin hayaan aderios minaya . So [facility] [phone].    ATENCIÓN: Si habla español, tiene a amezquita disposición servicios gratuitos de asistencia lingüística. Llame al [phone].    We comply with applicable federal civil rights laws and [place] laws. We do not discriminate on the basis of race, color, national origin, age, disability, sex, sexual orientation, or gender identity.            Thank you!     Thank you for choosing [facility]  for your care. Our goal is always to provide you with excellent care. Hearing back from our patients is one way we can continue to improve our services. Please take a few minutes to complete the written survey that you may receive in the mail after your visit with us. Thank you!             Your Updated Medication List - Protect others around you: Learn how to safely use, store and throw away your medicines at www.disposemymeds.org.          This list is accurate as of 6/7/18 11:29 AM.  Always use your most recent med list.                   Brand Name Dispense Instructions for use Diagnosis    BIOTIN PO      " Take 10,000 mcg by mouth daily    Bariatric surgery status, Obesity (BMI 30.0-34.9)       CALCIUM CITRATE + D PO      Take 500 mg by mouth 2 times daily        CYANOCOBALAMIN SL      Place 1,000 mcg under the tongue every morning    Bariatric surgery status, Obesity (BMI 30-39.9)       docusate sodium 100 MG tablet    COLACE    60 tablet    Take 100 mg by mouth daily    Iron deficiency, Status post bariatric surgery, Malnutrition following gastrointestinal surgery       drospirenone-ethinyl estradiol 3-0.02 MG per tablet    PRASHANT    28 tablet    Take 1 tablet by mouth daily    Excessive or frequent menstruation       multivitamin  peds with iron 60 MG chewable tablet      Take 2 chew tab by mouth every morning    Obesity, Class III, BMI 40-49.9 (morbid obesity) (H)       VITAMIN D (CHOLECALCIFEROL) PO      Take 2,000 Units by mouth daily    Obesity, Class III, BMI 40-49.9 (morbid obesity) (H)       VITRON-C PO      Take 2 tablets by mouth every morning

## 2018-06-07 NOTE — LETTER
6/7/2018         RE: Nicole Tinajero  [address]  [facility] [address]        Dear Colleague,    Thank you for referring your patient, Nicole Tinajero, to the [facility]. Please see a copy of my visit note below.    HISTORY OF PRESENT ILLNESS:    Nicole Tinajero is a 53 year old female who is seen in consultation at the request of Dr. Lew for Right hand injury that occurred on 6/1/2018.  Patient states that she was playing around with  and her arms were wrapped around her and being held by  and then she was working to release and injured hand.  Iced pack after and then went through weekend on a trip. Hand was swollen and discolored.  Kept hand ice and elevated as much as she could. UC on Tuesday 6/5/2018 x-rays done and placed in a splint and sling.  Patient does note the swelling has decreased since initial injury.     Present symptoms: discomfort of the splint.  Been in splint for last 36 hours.    Treatments tried to this point: tylenol as needed  Orthopedic PMH: none     Past Medical History:   Diagnosis Date     Hypertension     no problems since gastric bypass Aug 2016       Past Surgical History:   Procedure Laterality Date     COLONOSCOPY Left 4/20/2018    Procedure: COLONOSCOPY;  COLONOSCOPY ;  Surgeon: Austin Kahn MD;  Location:  GI     GASTRIC BYPASS      & hiatal hernia repair     HERNIA REPAIR      hiatal hernia repair with gastric bypass     LAPAROSCOPIC BYPASS GASTRIC, CHOLECYSTECTOMY, COMBINED N/A 8/17/2016    gallbladder NOT taken out-----still in     SLING TRANSPUBO WITHOUT ANTERIOR COLPORRHAPHY N/A 6/19/2017    Procedure: SLING TRANSPUBO WITHOUT ANTERIOR COLPORRHAPHY;  pubovaginal sling (altis);  Surgeon: Raad Rhoades MD;  Location:  OR       Family History   Problem Relation Age of Onset     CANCER Father      Hypertension Father      Lung Cancer Father      Chronic Obstructive Pulmonary Disease Mother      Hypertension  Mother      Depression Mother      Anxiety Disorder Mother      Obesity Mother      Colon Cancer No family hx of        Social History     Social History     Marital status:      Spouse name: N/A     Number of children: N/A     Years of education: N/A     Occupational History     Not on file.     Social History Main Topics     Smoking status: Never Smoker     Smokeless tobacco: Never Used     Alcohol use Yes      Comment: once per month - none since Aug 2016     Drug use: No     Sexual activity: Yes     Partners: Male     Birth control/ protection: Male Surgical      Comment: ; 2 children (26 and 19)     Other Topics Concern     Parent/Sibling W/ Cabg, Mi Or Angioplasty Before 65f 55m? No     Social History Narrative       Current Outpatient Prescriptions   Medication Sig Dispense Refill     BIOTIN PO Take 10,000 mcg by mouth daily       Calcium Citrate-Vitamin D (CALCIUM CITRATE + D PO) Take 500 mg by mouth 2 times daily        CYANOCOBALAMIN SL Place 1,000 mcg under the tongue every morning        docusate sodium (COLACE) 100 MG tablet Take 100 mg by mouth daily 60 tablet 1     drospirenone-ethinyl estradiol (PRASHANT) 3-0.02 MG per tablet Take 1 tablet by mouth daily 28 tablet 3     Iron-Vitamin C (VITRON-C PO) Take 2 tablets by mouth every morning        multivitamin  peds with iron (FLINTSTONES COMPLETE) 60 MG chewable tablet Take 2 chew tab by mouth every morning        VITAMIN D, CHOLECALCIFEROL, PO Take 2,000 Units by mouth daily         Allergies   Allergen Reactions     Sulfa Drugs Hives     Rash/hives         REVIEW OF SYSTEMS:  CONSTITUTIONAL:  NEGATIVE for fever, chills, change in weight  INTEGUMENTARY/SKIN:  NEGATIVE for worrisome rashes, moles or lesions  EYES:  NEGATIVE for vision changes or irritation  ENT/MOUTH:  NEGATIVE for ear, mouth and throat problems  RESP:  NEGATIVE for significant cough or SOB  BREAST:  NEGATIVE for masses, tenderness or discharge  CV:  NEGATIVE for chest pain,  "palpitations or peripheral edema, positive for high blood pressure  GI:  NEGATIVE for nausea, abdominal pain, change in bowel habits, positive for heartburn/ reflux  :  Negative   MUSCULOSKELETAL:  See HPI above  NEURO:  NEGATIVE for weakness, dizziness or paresthesias  ENDOCRINE:  NEGATIVE for temperature intolerance, skin/hair changes  HEME/ALLERGY/IMMUNE:  NEGATIVE for bleeding problems  PSYCHIATRIC:  NEGATIVE for changes in mood or affect      PHYSICAL EXAM:  /70  Ht 5' 6\" (1.676 m)  Wt 170 lb (77.1 kg)  BMI 27.44 kg/m2  Body mass index is 27.44 kg/(m^2).   GENERAL APPEARANCE: healthy, alert and no distress   HEENT: No apparent thyroid megaly. Clear sclera with normal ocular movement  RESPIRATORY: No labored breathing  SKIN: no suspicious lesions or rashes  NEURO: Normal strength and tone, mentation intact and speech normal  VASCULAR: Good pulses, and capillary refill   LYMPH: no lymphadenopathy   PSYCH:  mentation appears normal and affect normal/bright    MUSCULOSKELETAL:  Slight swelling in the fingers and hand, right  Felt much better once the splint was released  The Ace wrap was felt to be a little bit too tight.  Sensation is intact  Focal tenderness at the fifth metacarpal  Skin is intact  Ulnar styloid is also not erythematous      ASSESSMENT:  Distal fifth metacarpal fracture, right with mild intra-articular depression at the fracture site    PLAN:  We visualized x-rays from June 5, 2018.  Findings are thoroughly explained.  Even though the fracture involves intra-articular space and there is a very minimal step-off, nonoperative treatments felt to be reasonable.  We recommended continuation of the ulnar gutter splint that she has  We modified it so that she can use her index and long fingers.  We will see her back in 3 weeks with new x-rays out of the splint.  She can take it off quickly for shower.  All the questions were answered.        Imaging Interpretation:   None today      Jonah Rich, " MD  Department of Orthopedic Surgery        Disclaimer: This note consists of symbols derived from keyboarding, dictation and/or voice recognition software. As a result, there may be errors in the script that have gone undetected. Please consider this when interpreting information found in this chart.    Again, thank you for allowing me to participate in the care of your patient.        Sincerely,        Jonah Rich MD

## 2018-06-26 ENCOUNTER — HOSPITAL ENCOUNTER (OUTPATIENT)
Facility: CLINIC | Age: 53
Setting detail: SPECIMEN
Discharge: HOME | End: 2018-06-26
Attending: OBSTETRICS & GYNECOLOGY | Admitting: OBSTETRICS & GYNECOLOGY
Payer: COMMERCIAL

## 2018-06-26 ENCOUNTER — HOSPITAL ENCOUNTER (OUTPATIENT)
Dept: ULTRASOUND IMAGING | Facility: CLINIC | Age: 53
Discharge: HOME | End: 2018-06-26
Attending: OBSTETRICS & GYNECOLOGY | Admitting: OBSTETRICS & GYNECOLOGY
Payer: COMMERCIAL

## 2018-06-26 ENCOUNTER — ONCOLOGY VISIT (OUTPATIENT)
Dept: ONCOLOGY | Facility: CLINIC | Age: 53
End: 2018-06-26
Attending: OBSTETRICS & GYNECOLOGY
Payer: COMMERCIAL

## 2018-06-26 DIAGNOSIS — R19.00 PELVIC MASS: ICD-10-CM

## 2018-06-26 LAB — CANCER AG125 SERPL-ACNC: 54 U/ML (ref 0–30)

## 2018-06-26 PROCEDURE — 36415 COLL VENOUS BLD VENIPUNCTURE: CPT

## 2018-06-26 PROCEDURE — 76856 US EXAM PELVIC COMPLETE: CPT

## 2018-06-26 PROCEDURE — 86304 IMMUNOASSAY TUMOR CA 125: CPT | Performed by: OBSTETRICS & GYNECOLOGY

## 2018-06-26 NOTE — MR AVS SNAPSHOT
After Visit Summary   6/26/2018    Nicole Tinajero    MRN: [id number]           Patient Information     Date Of Birth          1965        Visit Information        Provider Department      6/26/2018 9:30 AM  ONCOLOGY NURSE [facility]        Today's Diagnoses     Pelvic mass           Follow-ups after your visit        Your next 10 appointments already scheduled     Jun 26, 2018 10:00 AM CDT   US PELVIC COMPLETE W TRANSVAGINAL with [email or web address]   [facility] ([facility])    [address]  [facility] [address] [phone]           Please bring a list of your medicines (including vitamins, minerals and over-the-counter drugs). Also, tell your doctor about any allergies you may have. Wear comfortable clothes and leave your valuables at home.  Adults: Drink six 8-ounce glasses of fluid one hour before your exam. Do NOT empty your bladder.  If you need to empty your bladder before your exam, try to release only a little bit of urine. Then, drink another 8oz glass of fluid.  Children: Children who are potty trained should drink at least 4 cups (32 oz) of liquid 45 minutes to one hour prior to the exam. The child s bladder must be full in order to achieve a diagnostic exam. If your child is very uncomfortable or has an urgent need to pee, please notify a technologist; they will try to find out how much longer the wait may be and provide instructions to help relieve the pressure. Occasionally it is medically necessary to insert a urinary catheter to fill the bladder.  Please call the Imaging Department at your exam site with any questions.            Jul 02, 2018  3:20 PM CDT   Return Visit with Jonah Rich MD   [facility] ([facility])    [address]  [address]  [facility] [address]   [phone]            Aug 17, 2018 10:00 AM CDT   Annual Visit with  Ulises Sahu PA-C   [facility] ([facility])    [address]  [address]  [place] [address] [phone]            Aug 17, 2018 10:30 AM CDT   Annual Visit with Logan Ross 2, RD   [facility] ([facility])    [address]  [address]  [place] [address] [phone]              Who to contact     If you have questions or need follow up information about today's clinic visit or your schedule please contact [facility] directly at [phone].  Normal or non-critical lab and imaging results will be communicated to you by [employer], letter or phone within 4 business days after the clinic has received the results. If you do not hear from us within 7 days, please contact the clinic through [employer] or phone. If you have a critical or abnormal lab result, we will notify you by phone as soon as possible.  Submit refill requests through [employer] or call your pharmacy and they will forward the refill request to us. Please allow 3 business days for your refill to be completed.          Additional Information About Your Visit        [employer] Information     [employer] gives you secure access to your electronic health record. If you see a primary care provider, you can also send messages to your care team and make appointments. If you have questions, please call your primary care clinic.  If you do not have a primary care provider, please call [phone] and they will assist you.        Care EveryWhere ID     This is your Care EveryWhere ID. This could be used by other organizations to access your [place] [facility] medical records  [id number]         Blood Pressure from Last 3 Encounters:   06/07/18 102/70   06/05/18 112/66   05/22/18 130/82    Weight from Last 3 Encounters:   06/07/18 77.1 kg (170 lb)   06/05/18 78.4 kg (172 lb 12.8 oz)   05/22/18 77.3 kg (170  lb 6 oz)              We Performed the Following             Primary Care Provider Office Phone # Fax #    June Narvaez [phone]       [address]  [facility] [address]        Equal Access to Services     [facility]ANNAH KOEHLER : Hadii almas ku hadkittyo Soomaali, waaxda luqadaha, qaybta kaalmada aderosada, josué preetiin hayaasammi hooverrios larson rei gomez. So [facility] [phone].    ATENCIÓN: Si habla español, tiene a amezquita disposición servicios gratuitos de asistencia lingüística. Llame al [phone].    We comply with applicable federal civil rights laws and [place] laws. We do not discriminate on the basis of race, color, national origin, age, disability, sex, sexual orientation, or gender identity.            Thank you!     Thank you for choosing [facility]  for your care. Our goal is always to provide you with excellent care. Hearing back from our patients is one way we can continue to improve our services. Please take a few minutes to complete the written survey that you may receive in the mail after your visit with us. Thank you!             Your Updated Medication List - Protect others around you: Learn how to safely use, store and throw away your medicines at www.disposemymeds.org.          This list is accurate as of 6/26/18  9:32 AM.  Always use your most recent med list.                   Brand Name Dispense Instructions for use Diagnosis    BIOTIN PO      Take 10,000 mcg by mouth daily    Bariatric surgery status, Obesity (BMI 30.0-34.9)       CALCIUM CITRATE + D PO      Take 500 mg by mouth 2 times daily        CYANOCOBALAMIN SL      Place 1,000 mcg under the tongue every morning    Bariatric surgery status, Obesity (BMI 30-39.9)       docusate sodium 100 MG tablet    COLACE    60 tablet    Take 100 mg by mouth daily    Iron deficiency, Status post bariatric surgery, Malnutrition following gastrointestinal surgery       drospirenone-ethinyl estradiol 3-0.02 MG  per tablet    PRASHANT    28 tablet    Take 1 tablet by mouth daily    Excessive or frequent menstruation       multivitamin  peds with iron 60 MG chewable tablet      Take 2 chew tab by mouth every morning    Obesity, Class III, BMI 40-49.9 (morbid obesity) (H)       VITAMIN D (CHOLECALCIFEROL) PO      Take 2,000 Units by mouth daily    Obesity, Class III, BMI 40-49.9 (morbid obesity) (H)       VITRON-C PO      Take 2 tablets by mouth every morning

## 2018-06-26 NOTE — LETTER
6/26/2018         RE: Nicole Tinajero  [address] [address]        Dear Colleague,    Thank you for referring your patient, Nicole Tinajero, to the [facility]. Please see a copy of my visit note below.    HPI      ROS      Physical Exam    See nursing note    Again, thank you for allowing me to participate in the care of your patient.        Sincerely,        [facility] Nurse

## 2018-06-26 NOTE — NURSING NOTE
Medical Assistant Note:  Nicole Tinajero presents today for Blood draw.    Patient seen by provider today: No.   present during visit today: Not Applicable.    Concerns: No Concerns.    Procedure:  Labs drawn: Yes.    Post Assessment:  Labs drawn without difficulty: Yes.    Discharge Plan:  Patient discharged in stable condition accompanied by: self.    Face to Face Time: 10min.    Sanam Kay MA

## 2018-07-02 ENCOUNTER — RADIANT APPOINTMENT (OUTPATIENT)
Dept: GENERAL RADIOLOGY | Facility: CLINIC | Age: 53
End: 2018-07-02
Attending: ORTHOPAEDIC SURGERY
Payer: COMMERCIAL

## 2018-07-02 ENCOUNTER — OFFICE VISIT (OUTPATIENT)
Dept: ORTHOPEDICS | Facility: CLINIC | Age: 53
End: 2018-07-02
Payer: COMMERCIAL

## 2018-07-02 VITALS — WEIGHT: 170 LBS | DIASTOLIC BLOOD PRESSURE: 74 MMHG | SYSTOLIC BLOOD PRESSURE: 116 MMHG | BODY MASS INDEX: 27.44 KG/M2

## 2018-07-02 DIAGNOSIS — S62.336A CLOSED DISPLACED FRACTURE OF NECK OF FIFTH METACARPAL BONE OF RIGHT HAND, INITIAL ENCOUNTER: ICD-10-CM

## 2018-07-02 DIAGNOSIS — S62.336A CLOSED DISPLACED FRACTURE OF NECK OF FIFTH METACARPAL BONE OF RIGHT HAND, INITIAL ENCOUNTER: Primary | ICD-10-CM

## 2018-07-02 PROCEDURE — 99207 ZZC FRACTURE CARE IN GLOBAL PERIOD: CPT | Performed by: ORTHOPAEDIC SURGERY

## 2018-07-02 PROCEDURE — 73130 X-RAY EXAM OF HAND: CPT | Mod: RT | Performed by: ORTHOPAEDIC SURGERY

## 2018-07-02 NOTE — PROGRESS NOTES
HISTORY OF PRESENT ILLNESS:    Nicole Tinajero is a 53 year old female who is seen in follow up for 5th metacarpal fracture DOI: 6/1/18. Patient states she has been compliant with use of ulnar gutter splint and only removes it when showering.   Present symptoms: no pain, no noted swelling, numbness or tingling.   Treatments tried to this point: ulnar gutter splint,   Past Medical History: Unchanged from the visit of 6/7/18. Please refer to that note.       REVIEW OF SYSTEMS:  CONSTITUTIONAL:  NEGATIVE for fever, chills, change in weight  INTEGUMENTARY/SKIN:  NEGATIVE for worrisome rashes, moles or lesions  EYES:  NEGATIVE for vision changes or irritation  ENT/MOUTH:  NEGATIVE for ear, mouth and throat problems  RESP:  NEGATIVE for significant cough or SOB  BREAST:  NEGATIVE for masses, tenderness or discharge  CV:  NEGATIVE for chest pain, palpitations or peripheral edema, positive for high blood pressure  GI:  NEGATIVE for nausea, abdominal pain, change in bowel habits, positive for heartburn/ reflux  :  Negative   MUSCULOSKELETAL:  See HPI above  NEURO:  NEGATIVE for weakness, dizziness or paresthesias  ENDOCRINE:  NEGATIVE for temperature intolerance, skin/hair changes  HEME/ALLERGY/IMMUNE:  NEGATIVE for bleeding problems  PSYCHIATRIC:  NEGATIVE for changes in mood or affect       PHYSICAL EXAM:  /74 (BP Location: Right arm, Patient Position: Chair, Cuff Size: Adult Regular)  Wt 170 lb (77.1 kg)  BMI 27.44 kg/m2  Body mass index is 27.44 kg/(m^2).   GENERAL APPEARANCE: healthy, alert and no distress   SKIN: no suspicious lesions or rashes  NEURO: Normal strength and tone, mentation intact and speech normal  VASCULAR:  good pulses, and cappillary refill   LYMPH: no lymphadenopathy   PSYCH:  mentation appears normal and affect normal/bright    MSK:  No significant swelling noted humming out of the splint  Limited finger range of motion as expected  She lacks about 2 cm from the fingertip to the palm  No  rotational deformity noted  Fracture site is not tender  Skin is intact  Sensation is intact    IMAGING INTERPRETATION: 3 views of the right hand demonstrate distal fifth metacarpal fracture, intra-articular with slight displacement.  Compared to the previous x-rays of June 5, 2018, no significant further displacement was felt to be present.  No other interval changes noted.        ASSESSMENT:  Fifth metacarpal fracture, right hand    PLAN:  We visualized the images of the x-rays from today.  Findings were explained.  These were compared to the previous x-rays of June 5, 2018 as well.  She does not need to be immobilized a longer.  However she is not quite ready for returning to lifting until she gains full range of motion of the fingers.  Very unlikely she will need a formal physical therapy.  Specific exercises were demonstrated today.  Since she will be going to [place] to relocate, we will see her back as needed.        Jonah Rich MD  Dept. Orthopedic Surgery  [facility]       Disclaimer: This note consists of symbols derived from keyboarding, dictation and/or voice recognition software. As a result, there may be errors in the script that have gone undetected. Please consider this when interpreting information found in this chart.

## 2018-07-02 NOTE — PATIENT INSTRUCTIONS
Start gentle range of motion exercises of the finger  Avoid lifting for 3 weeks  Follow-up as needed

## 2018-07-02 NOTE — MR AVS SNAPSHOT
After Visit Summary   7/2/2018    Nicole Tinajero    MRN: [id number]           Patient Information     Date Of Birth          1965        Visit Information        Provider Department      7/2/2018 3:20 PM Jonah Rich MD [facility] ORTHOPEDIC SURGERY        Today's Diagnoses     Closed displaced fracture of neck of fifth metacarpal bone of right hand, initial encounter    -  1      Care Instructions    Start gentle range of motion exercises of the finger  Avoid lifting for 3 weeks  Follow-up as needed          Follow-ups after your visit        Your next 10 appointments already scheduled     Aug 17, 2018 10:00 AM CDT   Annual Visit with Ulises Sahu PA-C   [facility] ([facility])    [address] [place] [address] [id number] [phone]            Aug 17, 2018 10:30 AM CDT   Annual Visit with Logan Connell Diet 2, RD   [facility] ([facility])    [address] [place] [id number] [phone]              Who to contact     If you have questions or need follow up information about today's clinic visit or your schedule please contact [facility] ORTHOPEDIC SURGERY directly at [phone].  Normal or non-critical lab and imaging results will be communicated to you by [employer], letter or phone within 4 business days after the clinic has received the results. If you do not hear from us within 7 days, please contact the clinic through [employer] or phone. If you have a critical or abnormal lab result, we will notify you by phone as soon as possible.  Submit refill requests through [employer] or call your pharmacy and they will forward the refill request to us. Please allow 3 business days for your refill to be completed.          Additional Information About Your Visit        F[employer] Information     [employer] gives you secure access to  your electronic health record. If you see a primary care provider, you can also send messages to your care team and make appointments. If you have questions, please call your primary care clinic.  If you do not have a primary care provider, please call [phone] and they will assist you.        Care EveryWhere ID     This is your Care EveryWhere ID. This could be used by other organizations to access your [facility] medical records  [id number]        Your Vitals Were     BMI (Body Mass Index)                   27.44 kg/m2            Blood Pressure from Last 3 Encounters:   07/02/18 116/74   06/07/18 102/70   06/05/18 112/66    Weight from Last 3 Encounters:   07/02/18 170 lb (77.1 kg)   06/07/18 170 lb (77.1 kg)   06/05/18 172 lb 12.8 oz (78.4 kg)               Primary Care Provider Office Phone # Fax #    June Narvaez [phone]       [address]  [facility] [address]        Equal Access to Services     [facility]: Hadii aad ku hadasho Soomaali, waaxda luqadaha, qaybta kaalmada adeegyada, waxay preetiin haymashan sheri minaya . So [facility] [phone].    ATENCIÓN: Si habla español, tiene a amezquita disposición servicios gratuitos de asistencia lingüística. Llame al [phone].    We comply with applicable federal civil rights laws and [place] laws. We do not discriminate on the basis of race, color, national origin, age, disability, sex, sexual orientation, or gender identity.            Thank you!     Thank you for choosing [facility] ORTHOPEDIC SURGERY  for your care. Our goal is always to provide you with excellent care. Hearing back from our patients is one way we can continue to improve our services. Please take a few minutes to complete the written survey that you may receive in the mail after your visit with us. Thank you!             Your Updated Medication List - Protect others around you: Learn how to safely use, store and throw away your medicines at  www.disposemymeds.org.          This list is accurate as of 7/2/18  3:56 PM.  Always use your most recent med list.                   Brand Name Dispense Instructions for use Diagnosis    BIOTIN PO      Take 10,000 mcg by mouth daily    Bariatric surgery status, Obesity (BMI 30.0-34.9)       CALCIUM CITRATE + D PO      Take 500 mg by mouth 2 times daily        CYANOCOBALAMIN SL      Place 1,000 mcg under the tongue every morning    Bariatric surgery status, Obesity (BMI 30-39.9)       docusate sodium 100 MG tablet    COLACE    60 tablet    Take 100 mg by mouth daily    Iron deficiency, Status post bariatric surgery, Malnutrition following gastrointestinal surgery       drospirenone-ethinyl estradiol 3-0.02 MG per tablet    PRASHANT    28 tablet    Take 1 tablet by mouth daily    Excessive or frequent menstruation       multivitamin  peds with iron 60 MG chewable tablet      Take 2 chew tab by mouth every morning    Obesity, Class III, BMI 40-49.9 (morbid obesity) (H)       VITAMIN D (CHOLECALCIFEROL) PO      Take 2,000 Units by mouth daily    Obesity, Class III, BMI 40-49.9 (morbid obesity) (H)       VITRON-C PO      Take 2 tablets by mouth every morning

## 2018-07-02 NOTE — LETTER
7/2/2018         RE: Nicole Tinajero  [address]  [facility] [address]        Dear Colleague,    Thank you for referring your patient, Nicole Tinajero, to the [facility] ORTHOPEDIC SURGERY. Please see a copy of my visit note below.    HISTORY OF PRESENT ILLNESS:    Nicole Tinajero is a 53 year old female who is seen in follow up for 5th metacarpal fracture DOI: 6/1/18. Patient states she has been compliant with use of ulnar gutter splint and only removes it when showering.   Present symptoms: no pain, no noted swelling, numbness or tingling.   Treatments tried to this point: ulnar gutter splint,   Past Medical History: Unchanged from the visit of 6/7/18. Please refer to that note.       REVIEW OF SYSTEMS:  CONSTITUTIONAL:  NEGATIVE for fever, chills, change in weight  INTEGUMENTARY/SKIN:  NEGATIVE for worrisome rashes, moles or lesions  EYES:  NEGATIVE for vision changes or irritation  ENT/MOUTH:  NEGATIVE for ear, mouth and throat problems  RESP:  NEGATIVE for significant cough or SOB  BREAST:  NEGATIVE for masses, tenderness or discharge  CV:  NEGATIVE for chest pain, palpitations or peripheral edema, positive for high blood pressure  GI:  NEGATIVE for nausea, abdominal pain, change in bowel habits, positive for heartburn/ reflux  :  Negative   MUSCULOSKELETAL:  See HPI above  NEURO:  NEGATIVE for weakness, dizziness or paresthesias  ENDOCRINE:  NEGATIVE for temperature intolerance, skin/hair changes  HEME/ALLERGY/IMMUNE:  NEGATIVE for bleeding problems  PSYCHIATRIC:  NEGATIVE for changes in mood or affect       PHYSICAL EXAM:  /74 (BP Location: Right arm, Patient Position: Chair, Cuff Size: Adult Regular)  Wt 170 lb (77.1 kg)  BMI 27.44 kg/m2  Body mass index is 27.44 kg/(m^2).   GENERAL APPEARANCE: healthy, alert and no distress   SKIN: no suspicious lesions or rashes  NEURO: Normal strength and tone, mentation intact and speech normal  VASCULAR:  good pulses, and cappillary  refill   LYMPH: no lymphadenopathy   PSYCH:  mentation appears normal and affect normal/bright    MSK:  No significant swelling noted humming out of the splint  Limited finger range of motion as expected  She lacks about 2 cm from the fingertip to the palm  No rotational deformity noted  Fracture site is not tender  Skin is intact  Sensation is intact    IMAGING INTERPRETATION: 3 views of the right hand demonstrate distal fifth metacarpal fracture, intra-articular with slight displacement.  Compared to the previous x-rays of June 5, 2018, no significant further displacement was felt to be present.  No other interval changes noted.        ASSESSMENT:  Fifth metacarpal fracture, right hand    PLAN:  We visualized the images of the x-rays from today.  Findings were explained.  These were compared to the previous x-rays of June 5, 2018 as well.  She does not need to be immobilized a longer.  However she is not quite ready for returning to lifting until she gains full range of motion of the fingers.  Very unlikely she will need a formal physical therapy.  Specific exercises were demonstrated today.  Since she will be going to [place] to relocate, we will see her back as needed.        Jonah Rich MD  Dept. Orthopedic Surgery  [facility]       Disclaimer: This note consists of symbols derived from keyboarding, dictation and/or voice recognition software. As a result, there may be errors in the script that have gone undetected. Please consider this when interpreting information found in this chart.      Again, thank you for allowing me to participate in the care of your patient.        Sincerely,        Jonah Rich MD

## 2019-04-19 ENCOUNTER — HEALTH MAINTENANCE LETTER (OUTPATIENT)
Age: 54
End: 2019-04-19

## 2020-03-02 ENCOUNTER — HEALTH MAINTENANCE LETTER (OUTPATIENT)
Age: 55
End: 2020-03-02

## 2020-12-20 ENCOUNTER — HEALTH MAINTENANCE LETTER (OUTPATIENT)
Age: 55
End: 2020-12-20

## 2021-04-24 ENCOUNTER — HEALTH MAINTENANCE LETTER (OUTPATIENT)
Age: 56
End: 2021-04-24

## 2021-10-03 ENCOUNTER — HEALTH MAINTENANCE LETTER (OUTPATIENT)
Age: 56
End: 2021-10-03

## 2022-05-15 ENCOUNTER — HEALTH MAINTENANCE LETTER (OUTPATIENT)
Age: 57
End: 2022-05-15

## 2022-09-10 ENCOUNTER — HEALTH MAINTENANCE LETTER (OUTPATIENT)
Age: 57
End: 2022-09-10

## 2023-06-03 ENCOUNTER — HEALTH MAINTENANCE LETTER (OUTPATIENT)
Age: 58
End: 2023-06-03

## 2023-10-10 NOTE — PATIENT INSTRUCTIONS
Follow-up in 3 weeks for new x-rays   Pt called and stated that she was seen here on 9/26/2023, for nausea and Ziyad Tinoco was going to prescribe her something for it but, she told him that she had something at home she wanted to try first.  Pt stated that Ziyad Tinoco said \"ok, well let me know if it doesn't work and ill send in something else\". Pt stated that she still is sick at her stomach and what would Ziyad Tinoco suggest to use for it? Pt can be reached @ [phone].

## (undated) DEVICE — DECANTER VIAL 2006S

## (undated) DEVICE — LINEN FULL SHEET 5511

## (undated) DEVICE — SOL NACL 0.9% 20ML VIAL

## (undated) DEVICE — KIT ENDO TURNOVER/PROCEDURE W/CLEAN A SCOPE LINERS 103888

## (undated) DEVICE — GLOVE PROTEXIS W/NEU-THERA 8.0  2D73TE80

## (undated) DEVICE — ESU GROUND PAD ADULT W/CORD E7507

## (undated) DEVICE — BAG CLEAR TRASH 1.3M 39X33" P4040C

## (undated) DEVICE — RETR RING LONE STAR 14.1X14.1CM 3307G

## (undated) DEVICE — BLADE CLIPPER 3M 9670

## (undated) DEVICE — SU VICRYL 2-0 UR-5 27" J375H

## (undated) DEVICE — SOL NACL 0.9% INJ 1000ML BAG 2B1324X

## (undated) DEVICE — MANIFOLD NEPTUNE 4 PORT 700-20

## (undated) DEVICE — LINEN HALF SHEET 5512

## (undated) DEVICE — RETR ELASTIC STAYS LONE STAR SHARP 5MM 8/PACK 3311-8G

## (undated) DEVICE — PACK SLING CUSTOM RIDGES

## (undated) DEVICE — PAD CHUX UNDERPAD 30X36" P3036C

## (undated) DEVICE — DECANTER BAG 2002S

## (undated) RX ORDER — LIDOCAINE HYDROCHLORIDE 10 MG/ML
INJECTION, SOLUTION EPIDURAL; INFILTRATION; INTRACAUDAL; PERINEURAL
Status: DISPENSED
Start: 2017-06-19

## (undated) RX ORDER — HYDROCODONE BITARTRATE AND ACETAMINOPHEN 5; 325 MG/1; MG/1
TABLET ORAL
Status: DISPENSED
Start: 2017-06-19

## (undated) RX ORDER — PROPOFOL 10 MG/ML
INJECTION, EMULSION INTRAVENOUS
Status: DISPENSED
Start: 2017-06-19

## (undated) RX ORDER — CEFAZOLIN SODIUM 2 G/100ML
INJECTION, SOLUTION INTRAVENOUS
Status: DISPENSED
Start: 2017-06-19

## (undated) RX ORDER — FENTANYL CITRATE 50 UG/ML
INJECTION, SOLUTION INTRAMUSCULAR; INTRAVENOUS
Status: DISPENSED
Start: 2018-04-20

## (undated) RX ORDER — ONDANSETRON 2 MG/ML
INJECTION INTRAMUSCULAR; INTRAVENOUS
Status: DISPENSED
Start: 2017-06-19

## (undated) RX ORDER — FENTANYL CITRATE 50 UG/ML
INJECTION, SOLUTION INTRAMUSCULAR; INTRAVENOUS
Status: DISPENSED
Start: 2017-06-19

## (undated) RX ORDER — DEXAMETHASONE SODIUM PHOSPHATE 4 MG/ML
INJECTION, SOLUTION INTRA-ARTICULAR; INTRALESIONAL; INTRAMUSCULAR; INTRAVENOUS; SOFT TISSUE
Status: DISPENSED
Start: 2017-06-19

## (undated) RX ORDER — GLYCOPYRROLATE 0.2 MG/ML
INJECTION INTRAMUSCULAR; INTRAVENOUS
Status: DISPENSED
Start: 2017-06-19